# Patient Record
Sex: FEMALE | Race: WHITE | NOT HISPANIC OR LATINO | Employment: OTHER | URBAN - METROPOLITAN AREA
[De-identification: names, ages, dates, MRNs, and addresses within clinical notes are randomized per-mention and may not be internally consistent; named-entity substitution may affect disease eponyms.]

---

## 2019-09-16 ENCOUNTER — TRANSFERRED RECORDS (OUTPATIENT)
Dept: HEALTH INFORMATION MANAGEMENT | Facility: CLINIC | Age: 59
End: 2019-09-16

## 2020-02-14 ENCOUNTER — TRANSFERRED RECORDS (OUTPATIENT)
Dept: MULTI SPECIALTY CLINIC | Facility: CLINIC | Age: 60
End: 2020-02-14

## 2020-11-05 ENCOUNTER — TRANSFERRED RECORDS (OUTPATIENT)
Dept: HEALTH INFORMATION MANAGEMENT | Facility: CLINIC | Age: 60
End: 2020-11-05

## 2021-04-28 ENCOUNTER — TRANSFERRED RECORDS (OUTPATIENT)
Dept: HEALTH INFORMATION MANAGEMENT | Facility: CLINIC | Age: 61
End: 2021-04-28

## 2021-05-03 DIAGNOSIS — H91.90 UNSPECIFIED HEARING LOSS, UNSPECIFIED EAR: Primary | ICD-10-CM

## 2021-05-26 PROBLEM — H40.9 GLAUCOMA (INCREASED EYE PRESSURE): Status: ACTIVE | Noted: 2021-05-26

## 2021-05-26 PROBLEM — I10 HYPERTENSION: Status: ACTIVE | Noted: 2021-05-26

## 2021-05-26 PROBLEM — G35 MS (MULTIPLE SCLEROSIS) (H): Status: ACTIVE | Noted: 2021-05-26

## 2021-05-27 ENCOUNTER — OFFICE VISIT (OUTPATIENT)
Dept: AUDIOLOGY | Facility: OTHER | Age: 61
End: 2021-05-27
Attending: AUDIOLOGIST
Payer: COMMERCIAL

## 2021-05-27 DIAGNOSIS — H90.3 SENSORINEURAL HEARING LOSS (SNHL) OF BOTH EARS: Primary | ICD-10-CM

## 2021-05-27 DIAGNOSIS — H91.90 UNSPECIFIED HEARING LOSS, UNSPECIFIED EAR: ICD-10-CM

## 2021-05-27 PROCEDURE — 92557 COMPREHENSIVE HEARING TEST: CPT | Performed by: AUDIOLOGIST

## 2021-05-27 PROCEDURE — 92567 TYMPANOMETRY: CPT | Performed by: AUDIOLOGIST

## 2021-05-27 NOTE — PROGRESS NOTES
Audiology Evaluation Completed. Please refer SCANNED AUDIOGRAM and/or TYMPANOGRAM for BACKGROUND, RESULTS, RECOMMENDATIONS.      Lolly VEGA, [facility]  Audiologist #[id number]

## 2021-06-01 ENCOUNTER — OFFICE VISIT (OUTPATIENT)
Dept: OTOLARYNGOLOGY | Facility: OTHER | Age: 61
End: 2021-06-01
Attending: NURSE PRACTITIONER
Payer: COMMERCIAL

## 2021-06-01 VITALS
BODY MASS INDEX: 35.68 KG/M2 | WEIGHT: 170 LBS | OXYGEN SATURATION: 98 % | HEART RATE: 57 BPM | TEMPERATURE: 97.2 F | DIASTOLIC BLOOD PRESSURE: 66 MMHG | HEIGHT: 58 IN | SYSTOLIC BLOOD PRESSURE: 108 MMHG

## 2021-06-01 DIAGNOSIS — G35 MS (MULTIPLE SCLEROSIS) (H): ICD-10-CM

## 2021-06-01 DIAGNOSIS — H93.13 TINNITUS, BILATERAL: ICD-10-CM

## 2021-06-01 DIAGNOSIS — H90.3 SENSORINEURAL HEARING LOSS (SNHL) OF BOTH EARS: Primary | ICD-10-CM

## 2021-06-01 PROCEDURE — 99213 OFFICE O/P EST LOW 20 MIN: CPT | Performed by: NURSE PRACTITIONER

## 2021-06-01 PROCEDURE — G0463 HOSPITAL OUTPT CLINIC VISIT: HCPCS

## 2021-06-01 ASSESSMENT — PAIN SCALES - GENERAL: PAINLEVEL: NO PAIN (0)

## 2021-06-01 ASSESSMENT — MIFFLIN-ST. JEOR: SCORE: 1220.86

## 2021-06-01 NOTE — PATIENT INSTRUCTIONS
Medically cleared for hearing aids      Tinnitus education was provided.  Tinnitus is widely considered a disorder of cental auditory processing.     Hearing preservation was reinforced   I also cautioned the patient against investing in any oral supplements advertised to cure tinnitus.     I have also recommended yearly audiograms, masking devices or apps.  For worsening symptoms, I recommend online or in person cognitive behavioral therapy (CBT) referral.       Thank you for allowing Carolann GARCIA and our ENT team to participate in your care.  If your medications are too expensive, please call my nurse at the number listed below.  We can possibly change this medication.    If you have a scheduling or an appointment question please contact our Health Unit Coordinator at their direct line [phone].   ALL nursing questions or concerns can be directed to my Nurse Idalmis [phone].

## 2021-06-01 NOTE — NURSING NOTE
"Chief Complaint   Patient presents with     Suspected Hearing Decrease     Unspecified ear        Initial /66 (Cuff Size: Adult Regular)   Pulse 57   Temp 97.2  F (36.2  C) (Tympanic)   Ht 1.473 m (4' 10\")   Wt 77.1 kg (170 lb)   SpO2 98%   BMI 35.53 kg/m   Estimated body mass index is 35.53 kg/m  as calculated from the following:    Height as of this encounter: 1.473 m (4' 10\").    Weight as of this encounter: 77.1 kg (170 lb).  Medication Reconciliation: complete  Anabela Valle LPN    "

## 2021-06-01 NOTE — PROGRESS NOTES
Otolaryngology Note         Chief Complaint:     Patient presents with:  Suspected Hearing Decrease: Unspecified ear            History of Present Illness:     Farzana William is a 61 year old female seen today for gradual hearing loss, she has noted a worsening over the past 6 months.    She has bilateral tinnitus that is high pitched and non-pulsatile that started in the distant past and has increased recently  She notes the ringing has been bothersome.  It is constant and worse in the evenings.  She is able to fall asleep but states it wakes her up at night.    There is no otalgia or otorrhea.   No vertigo  No facial numbness or tingling.   No history of otological surgery  No history of COM or frequent OM  No history of ear surgery or trauma to the ear   No significant noise exposure.    There is a family history of hearing loss - mother has hearing loss, she has 2 siblings, no concerns with hearing loss in siblings.   No prior audiograms    Caffeine - 1-2 cups of coffee per day  Alcohol - 4 drinks per week  Tobacco - former smoker  Salt - moderate  Stress - no significant    She has a history of Primary Progressive MS diagnosed in 2005.  She reports she has had increasing right sided weakness over the past several years. She is not current with her Neurologist and is not currently taking any MS treatment.  She does home PT exercises, no formal PT.  She states she will be calling to schedule a follow up with her neurologist.      Audiogram completed 5/27/21:   Tympanograms are Type AS for both ears suggesting reduced admittance. Acoustic reflexes present and could not complete due to  continued leaking even with good probe fit.  Thresholds are normal sloping to moderate sensorineural hearing loss both ears.  Speech reception thresholds are in good agreement with pure tone average.  Word discrimination scores are excellent at supra-thresholds level         Medications:     Current Outpatient Rx   Medication  "Sig Dispense Refill     cloNIDine (CATAPRES) 0.2 MG tablet Take 0.2 tablets by mouth 2 times daily.       sertraline (ZOLOFT) 50 MG tablet Take 1 tablet by mouth daily.       simvastatin (ZOCOR) 20 MG tablet Take 1 tablet by mouth every evening.       traZODone (DESYREL) 50 MG tablet Take 1 tablet by mouth At Bedtime. As needed for sleep              Allergies:     Allergies: Patient has no known allergies.          Past Medical History:     Past Medical History:   Diagnosis Date     Alcohol abuse 2011    stopped      Chronic kidney disease 2011     Hyperplastic colon polyp 2011     MS (multiple sclerosis) 2011    primary progressive MS dx'd      Tobacco abuse 2011    quit      Unspecified essential hypertension 2004     Unspecified hypertensive heart disease with heart failure(402.91) 2004            Past Surgical History:     Past Surgical History:   Procedure Laterality Date      section X2       COLONOSCOPY  2009    due 2014     LEEP       RELEASE CARPAL TUNNEL      bilateral            Social History:     Social History     Tobacco Use     Smoking status: Former Smoker     Types: Cigarettes     Smokeless tobacco: Never Used     Tobacco comment: quit    Substance Use Topics     Alcohol use: Yes     Comment: daily     Drug use: None            Review of Systems:     ROS: See HPI         Physical Exam:     /66 (Cuff Size: Adult Regular)   Pulse 57   Temp 97.2  F (36.2  C) (Tympanic)   Ht 1.473 m (4' 10\")   Wt 77.1 kg (170 lb)   SpO2 98%   BMI 35.53 kg/m      General - The patient is well nourished and well developed, and appears to have good nutritional status.  Alert and oriented to person and place, answers questions and cooperates with examination appropriately.   Head and Face - Normocephalic and atraumatic, with no gross asymmetry noted.  The facial nerve is intact, with strong symmetric movements.  She has slight right sided " facial droop with talking, symmetric smile, HB 1/6  Voice and Breathing - The patient was breathing comfortably without the use of accessory muscles. There was no wheezing, stridor. The patients voice was clear and strong, and had appropriate pitch and quality.  Ears - External ear normal. Canals are patent. Right tympanic membrane is intact without effusion, retraction or mass. Left tympanic membrane is intact without effusion, retraction or mass.  Eyes - Extraocular movements intact, sclera were not icteric or injected, conjunctiva were pink and moist.  Mouth - Examination of the oral cavity showed pink, healthy oral mucosa. Dentition in good condition. No lesions or ulcerations noted. The tongue was mobile and midline.   Throat - The walls of the oropharynx were smooth, pink, moist, symmetric, and had no lesions or ulcerations.  The tonsillar pillars and soft palate were symmetric. The uvula was midline on elevation.    Neck - Normal midline excursion of the laryngotracheal complex during swallowing.  Full range of motion on passive movement.  Palpation of the occipital, submental, submandibular, internal jugular chain, and supraclavicular nodes did not demonstrate any abnormal lymph nodes or masses.  Palpation of the thyroid was soft and smooth, with no nodules or goiter appreciated.  The trachea was mobile and midline.  Nose - External contour is symmetric, no gross deflection or scars.  Nasal mucosa is pink and moist with no abnormal mucus.  The septum and turbinates were evaluated with nasal speculum, no polyps, masses, or purulence noted on examination of anterior nasal cavity.         Assessment and Plan:       ICD-10-CM    1. Sensorineural hearing loss (SNHL) of both ears  H90.3    2. MS (multiple sclerosis) (H)  G35    3. Tinnitus, bilateral  H93.13      Medically cleared for hearing aids      Tinnitus education was provided.  Tinnitus is widely considered a disorder of cental auditory processing.      Hearing preservation was reinforced   I also cautioned the patient against investing in any oral supplements advertised to cure tinnitus.     I have also recommended yearly audiograms, masking devices or apps.  For worsening symptoms, I recommend online or in person cognitive behavioral therapy (CBT) referral.     Follow up with Neurologist HARIS GARCIA  [facility]  4:02 PM  June 1, 2021

## 2021-06-01 NOTE — LETTER
6/1/2021         RE: Farzana William  [address] [place] [address]        Dear Colleague,    Thank you for referring your patient, Farzana William, to the [facility]. Please see a copy of my visit note below.      Otolaryngology Note         Chief Complaint:     Patient presents with:  Suspected Hearing Decrease: Unspecified ear            History of Present Illness:     Farzana William is a 61 year old female seen today for gradual hearing loss, she has noted a worsening over the past 6 months.    She has bilateral tinnitus that is high pitched and non-pulsatile that started in the distant past and has increased recently  She notes the ringing has been bothersome.  It is constant and worse in the evenings.  She is able to fall asleep but states it wakes her up at night.    There is no otalgia or otorrhea.   No vertigo  No facial numbness or tingling.   No history of otological surgery  No history of COM or frequent OM  No history of ear surgery or trauma to the ear   No significant noise exposure.    There is a family history of hearing loss - mother has hearing loss, she has 2 siblings, no concerns with hearing loss in siblings.   No prior audiograms    Caffeine - 1-2 cups of coffee per day  Alcohol - 4 drinks per week  Tobacco - former smoker  Salt - moderate  Stress - no significant    She has a history of Primary Progressive MS diagnosed in 2005.  She reports she has had increasing right sided weakness over the past several years. She is not current with her Neurologist and is not currently taking any MS treatment.  She does home PT exercises, no formal PT.  She states she will be calling to schedule a follow up with her neurologist.      Audiogram completed 5/27/21:   Tympanograms are Type AS for both ears suggesting reduced admittance. Acoustic reflexes present and could not complete due to  continued leaking even with good probe fit.  Thresholds are normal sloping to  "moderate sensorineural hearing loss both ears.  Speech reception thresholds are in good agreement with pure tone average.  Word discrimination scores are excellent at supra-thresholds level         Medications:     Current Outpatient Rx   Medication Sig Dispense Refill     cloNIDine (CATAPRES) 0.2 MG tablet Take 0.2 tablets by mouth 2 times daily.       sertraline (ZOLOFT) 50 MG tablet Take 1 tablet by mouth daily.       simvastatin (ZOCOR) 20 MG tablet Take 1 tablet by mouth every evening.       traZODone (DESYREL) 50 MG tablet Take 1 tablet by mouth At Bedtime. As needed for sleep              Allergies:     Allergies: Patient has no known allergies.          Past Medical History:     Past Medical History:   Diagnosis Date     Alcohol abuse 2011    stopped      Chronic kidney disease 2011     Hyperplastic colon polyp 2011     MS (multiple sclerosis) 2011    primary progressive MS dx'd      Tobacco abuse 2011    quit      Unspecified essential hypertension 2004     Unspecified hypertensive heart disease with heart failure(402.91) 2004            Past Surgical History:     Past Surgical History:   Procedure Laterality Date      section X2       COLONOSCOPY  2009    due 2014     LEEP       RELEASE CARPAL TUNNEL  2011    bilateral            Social History:     Social History     Tobacco Use     Smoking status: Former Smoker     Types: Cigarettes     Smokeless tobacco: Never Used     Tobacco comment: quit    Substance Use Topics     Alcohol use: Yes     Comment: daily     Drug use: None            Review of Systems:     ROS: See HPI         Physical Exam:     /66 (Cuff Size: Adult Regular)   Pulse 57   Temp 97.2  F (36.2  C) (Tympanic)   Ht 1.473 m (4' 10\")   Wt 77.1 kg (170 lb)   SpO2 98%   BMI 35.53 kg/m      General - The patient is well nourished and well developed, and appears to have good nutritional status.  Alert and oriented to " person and place, answers questions and cooperates with examination appropriately.   Head and Face - Normocephalic and atraumatic, with no gross asymmetry noted.  The facial nerve is intact, with strong symmetric movements.  She has slight right sided facial droop with talking, symmetric smile, HB 1/6  Voice and Breathing - The patient was breathing comfortably without the use of accessory muscles. There was no wheezing, stridor. The patients voice was clear and strong, and had appropriate pitch and quality.  Ears - External ear normal. Canals are patent. Right tympanic membrane is intact without effusion, retraction or mass. Left tympanic membrane is intact without effusion, retraction or mass.  Eyes - Extraocular movements intact, sclera were not icteric or injected, conjunctiva were pink and moist.  Mouth - Examination of the oral cavity showed pink, healthy oral mucosa. Dentition in good condition. No lesions or ulcerations noted. The tongue was mobile and midline.   Throat - The walls of the oropharynx were smooth, pink, moist, symmetric, and had no lesions or ulcerations.  The tonsillar pillars and soft palate were symmetric. The uvula was midline on elevation.    Neck - Normal midline excursion of the laryngotracheal complex during swallowing.  Full range of motion on passive movement.  Palpation of the occipital, submental, submandibular, internal jugular chain, and supraclavicular nodes did not demonstrate any abnormal lymph nodes or masses.  Palpation of the thyroid was soft and smooth, with no nodules or goiter appreciated.  The trachea was mobile and midline.  Nose - External contour is symmetric, no gross deflection or scars.  Nasal mucosa is pink and moist with no abnormal mucus.  The septum and turbinates were evaluated with nasal speculum, no polyps, masses, or purulence noted on examination of anterior nasal cavity.         Assessment and Plan:       ICD-10-CM    1. Sensorineural hearing loss (SNHL)  of both ears  H90.3    2. MS (multiple sclerosis) (H)  G35    3. Tinnitus, bilateral  H93.13      Medically cleared for hearing aids      Tinnitus education was provided.  Tinnitus is widely considered a disorder of cental auditory processing.     Hearing preservation was reinforced   I also cautioned the patient against investing in any oral supplements advertised to cure tinnitus.     I have also recommended yearly audiograms, masking devices or apps.  For worsening symptoms, I recommend online or in person cognitive behavioral therapy (CBT) referral.     Follow up with Neurologist HARIS GARCIA  [facility]  4:02 PM  June 1, 2021        Again, thank you for allowing me to participate in the care of your patient.        Sincerely,        Carolann Scott NP

## 2021-06-04 ENCOUNTER — MYC MEDICAL ADVICE (OUTPATIENT)
Dept: AUDIOLOGY | Facility: OTHER | Age: 61
End: 2021-06-04

## 2021-06-07 NOTE — TELEPHONE ENCOUNTER
I printed the audiogram as she requested and gave it to registration desk in sealed envelope for Pt to .

## 2021-06-26 ENCOUNTER — HEALTH MAINTENANCE LETTER (OUTPATIENT)
Age: 61
End: 2021-06-26

## 2021-08-23 ENCOUNTER — MEDICAL CORRESPONDENCE (OUTPATIENT)
Dept: BONE DENSITY | Facility: HOSPITAL | Age: 61
End: 2021-08-23

## 2021-08-24 ENCOUNTER — HOSPITAL ENCOUNTER (OUTPATIENT)
Dept: BONE DENSITY | Facility: HOSPITAL | Age: 61
Discharge: HOME | End: 2021-08-24
Attending: FAMILY MEDICINE | Admitting: INTERNAL MEDICINE
Payer: COMMERCIAL

## 2021-08-24 DIAGNOSIS — E21.0 PRIMARY HYPERPARATHYROIDISM (H): ICD-10-CM

## 2021-08-24 PROCEDURE — 77080 DXA BONE DENSITY AXIAL: CPT

## 2021-10-16 ENCOUNTER — HEALTH MAINTENANCE LETTER (OUTPATIENT)
Age: 61
End: 2021-10-16

## 2022-02-17 ENCOUNTER — TRANSFERRED RECORDS (OUTPATIENT)
Dept: HEALTH INFORMATION MANAGEMENT | Facility: CLINIC | Age: 62
End: 2022-02-17

## 2022-02-17 LAB
ALT SERPL-CCNC: 16 U/L (ref 6–29)
AST SERPL-CCNC: 15 U/L (ref 10–35)
CHOLESTEROL (EXTERNAL): 173 MG/DL
CREATININE (EXTERNAL): 0.96 MG/DL (ref 0.5–0.99)
GFR ESTIMATED (EXTERNAL): 63 ML/MIN/1.73M2
GFR ESTIMATED (IF AFRICAN AMERICAN) (EXTERNAL): 73 ML/MIN/1.73M2
GLUCOSE (EXTERNAL): 94 MG/DL (ref 65–99)
HBA1C MFR BLD: 5.5 %
HDLC SERPL-MCNC: 64 MG/DL
LDL CHOLESTEROL CALCULATED (EXTERNAL): 92 MG/DL
NON HDL CHOLESTEROL (EXTERNAL): 109 MG/DL
POTASSIUM (EXTERNAL): 3.6 MMOL/L (ref 3.5–5.3)
TRIGLYCERIDES (EXTERNAL): 79 MG/DL
TSH SERPL-ACNC: 1.29 MIU/L (ref 0.4–4.5)

## 2022-06-29 ENCOUNTER — TELEPHONE (OUTPATIENT)
Dept: FAMILY MEDICINE | Facility: OTHER | Age: 62
End: 2022-06-29

## 2022-06-29 NOTE — TELEPHONE ENCOUNTER
Called pt to reschedule new pt appointment with Dr. Oh. Pt did not like the option of next available day in Sept. Offered up Dr. Kimberly Christianson on Aug 15. Pt said forget it and hung up on me.

## 2022-07-17 ENCOUNTER — HEALTH MAINTENANCE LETTER (OUTPATIENT)
Age: 62
End: 2022-07-17

## 2022-07-18 ENCOUNTER — TRANSFERRED RECORDS (OUTPATIENT)
Dept: HEALTH INFORMATION MANAGEMENT | Facility: CLINIC | Age: 62
End: 2022-07-18

## 2022-08-08 NOTE — PROGRESS NOTES
Assessment & Plan     Primary hypertension  PAD (peripheral artery disease) (H)  Dyslipidemia  MS (multiple sclerosis) (H)  Arthritis  Depression with anxiety  Situational anxiety  Constipation, unspecified constipation type  Insomnia secondary to depression with anxiety    - Comprehensive metabolic panel (BMP + Alb, Alk Phos, ALT, AST, Total. Bili, TP)  - Magnesium    - lisinopril-hydrochlorothiazide (ZESTORETIC) 20-25 MG tablet; Take 1 tablet by mouth daily  - cloNIDine (CATAPRES) 0.1 MG tablet; Take 1 tablet (0.1 mg) by mouth 2 times daily  - simvastatin (ZOCOR) 20 MG tablet; Take 1 tablet (20 mg) by mouth At Bedtime  - oxybutynin (DITROPAN) 5 MG tablet; Take 1 tablet (5 mg) by mouth every evening  - ibuprofen (ADVIL/MOTRIN) 800 MG tablet; Take 1 tablet (800 mg) by mouth every 8 hours as needed for moderate pain  - sertraline (ZOLOFT) 50 MG tablet; Take 1 tablet (50 mg) by mouth daily  - traZODone (DESYREL) 50 MG tablet; Take 1 tablet (50 mg) by mouth At Bedtime As needed for sleep  - melatonin 10 MG TABS tablet; Take 1 tablet (10 mg) by mouth At Bedtime  - sennosides (SENOKOT) 8.6 MG tablet; Take 1 tablet by mouth daily as needed for constipation ((She takes OTC 25mg dose as needed for constipation a couple times per month))  - ALPRAZolam (XANAX) 0.25 MG tablet; As needed for flying. (Purchased in [place].)    Requested med refills sent in.  Update lytes.  Try changing her Clonidine to 0.1mg twice daily.  She will continue following with Neuro in [place] for her MS.  She declines any eval/mgmt for her PAD at this time - could consider formal ABIs and then adding aspirin and increasing her statin.  Follow-up 6 months, sooner if needed.      YVONNE ZENDEJAS,   [facility] [place]   Farzana is a 62 year old presenting for the following health issues:  Establish Care      HPI   ESTABLISH CARE     Farzana is a 62-year-old female being seen today to establish care.  She used to see Dr. Dr Grijalva  at [facility] but he retired.  She reports she was last seen about a year ago.  She also has a physician team in [place] as she lives there for 6 months out of the year, she brings in her labs from February for review.  She lives in the [facility] area, she will be leaving again on October 20 to head to [place].    Hypertension, on the lower end today.  She states she is on clonidine 0.2 mg once daily.  She states she used to be on 0.2 mg twice daily but she self decreased her medication due to having symptomatic lows in the 70s over 40s.  She is also on lisinopril-HCTZ 20-25 mg daily.  When I asked her why she is on clonidine instead of something else for her blood pressure, she reports this what she was started on a long time ago and it was never changed.    Vitamin D deficiency, currently on 2000 units daily.  Her vitamin D was in the normal range at 38 back in February.    Depression, she feels this is doing well on her Zoloft 50 mg daily.    Situational Anxiety, gets Xanax in [place].    She takes trazodone 50 mg nightly to help her sleep, she also takes melatonin 10 mg.    Constipation, occasionally uses Senokot a couple times per month.    Multiple sclerosis.  She states she used to see [facility] for this, but she is no longer following with them.  She has an upcoming appointment neurology in [place] on October 24.  She is on oxybutynin which helps with bladder control.    Hyperlipidemia, tolerating simvastatin 20 mg.    BMI 34.93.    She states ibuprofen as needed for various pains, location varies, sometimes back, feet, legs.  She has arthritis.    Mammogram -reports normal a couple years ago - declines repeat/updated mammogram as she states she would not pursure treatment if cancer were found.    Colonoscopy - March 2022 - 1 polyp, repeat 5 years.  [place].    Pap 3 yrs ago, reportedly done by Dr. Grijalva and normal, but she states history of abnormals in past.  Unsure when she is supposed to  "repeat or if she wants to repeat.    She shows me some paperwork from a home nurse screening that states she had abnormal leg circulation testing.  With questioning, she does endorse some bilateral calf cramping with activity, worse on the right, she does not wish to pursue more formal evaluation or management presently as her symptoms are not limiting her.  Lipids 2/17/22: Total 173 TG 79 HDL 64 LDL 92.        Review of Systems   Constitutional: Negative for fever.   Respiratory: Negative for shortness of breath.    Cardiovascular: Negative for chest pain, palpitations and peripheral edema.            Objective    /65 (BP Location: Right arm, Patient Position: Chair)   Pulse 59   Temp 98.7  F (37.1  C)   Resp 18   Ht 1.486 m (4' 10.5\")   Wt 77.1 kg (170 lb)   SpO2 98%   BMI 34.93 kg/m    Body mass index is 34.93 kg/m .  Physical Exam  Constitutional:       General: She is not in acute distress.     Appearance: Normal appearance.   HENT:      Head: Normocephalic and atraumatic.      Right Ear: Tympanic membrane and external ear normal.      Left Ear: Tympanic membrane and external ear normal.      Mouth/Throat:      Pharynx: No oropharyngeal exudate.   Eyes:      General: No scleral icterus.     Extraocular Movements: Extraocular movements intact.      Conjunctiva/sclera: Conjunctivae normal.      Pupils: Pupils are equal, round, and reactive to light.   Neck:      Vascular: No carotid bruit.   Cardiovascular:      Rate and Rhythm: Normal rate and regular rhythm.      Pulses:           Dorsalis pedis pulses are 1+ on the right side and 1+ on the left side.      Heart sounds: Normal heart sounds. No murmur heard.  Pulmonary:      Effort: Pulmonary effort is normal.      Breath sounds: Normal breath sounds. No wheezing.   Abdominal:      General: Bowel sounds are normal.      Palpations: Abdomen is soft.   Musculoskeletal:      Cervical back: Neck supple.   Lymphadenopathy:      Cervical: No cervical " adenopathy.   Neurological:      Mental Status: She is alert and oriented to person, place, and time.                .  ..

## 2022-08-11 ENCOUNTER — OFFICE VISIT (OUTPATIENT)
Dept: FAMILY MEDICINE | Facility: OTHER | Age: 62
End: 2022-08-11
Attending: FAMILY MEDICINE
Payer: COMMERCIAL

## 2022-08-11 VITALS
TEMPERATURE: 98.7 F | HEIGHT: 59 IN | RESPIRATION RATE: 18 BRPM | WEIGHT: 170 LBS | DIASTOLIC BLOOD PRESSURE: 65 MMHG | BODY MASS INDEX: 34.27 KG/M2 | HEART RATE: 59 BPM | SYSTOLIC BLOOD PRESSURE: 100 MMHG | OXYGEN SATURATION: 98 %

## 2022-08-11 DIAGNOSIS — I73.9 PAD (PERIPHERAL ARTERY DISEASE) (H): ICD-10-CM

## 2022-08-11 DIAGNOSIS — E78.5 DYSLIPIDEMIA: ICD-10-CM

## 2022-08-11 DIAGNOSIS — F41.8 INSOMNIA SECONDARY TO DEPRESSION WITH ANXIETY: ICD-10-CM

## 2022-08-11 DIAGNOSIS — F41.8 SITUATIONAL ANXIETY: ICD-10-CM

## 2022-08-11 DIAGNOSIS — K59.00 CONSTIPATION, UNSPECIFIED CONSTIPATION TYPE: ICD-10-CM

## 2022-08-11 DIAGNOSIS — G35 MS (MULTIPLE SCLEROSIS) (H): ICD-10-CM

## 2022-08-11 DIAGNOSIS — I10 PRIMARY HYPERTENSION: Primary | ICD-10-CM

## 2022-08-11 DIAGNOSIS — M19.90 ARTHRITIS: ICD-10-CM

## 2022-08-11 DIAGNOSIS — F41.8 DEPRESSION WITH ANXIETY: ICD-10-CM

## 2022-08-11 DIAGNOSIS — F51.05 INSOMNIA SECONDARY TO DEPRESSION WITH ANXIETY: ICD-10-CM

## 2022-08-11 LAB
ALBUMIN SERPL-MCNC: 4.1 G/DL (ref 3.4–5)
ALP SERPL-CCNC: 97 U/L (ref 40–150)
ALT SERPL W P-5'-P-CCNC: 26 U/L (ref 0–50)
ANION GAP SERPL CALCULATED.3IONS-SCNC: 8 MMOL/L (ref 3–14)
AST SERPL W P-5'-P-CCNC: 16 U/L (ref 0–45)
BILIRUB SERPL-MCNC: 0.5 MG/DL (ref 0.2–1.3)
BUN SERPL-MCNC: 27 MG/DL (ref 7–30)
CALCIUM SERPL-MCNC: 10.2 MG/DL (ref 8.5–10.1)
CHLORIDE BLD-SCNC: 106 MMOL/L (ref 94–109)
CO2 SERPL-SCNC: 25 MMOL/L (ref 20–32)
CREAT SERPL-MCNC: 0.98 MG/DL (ref 0.52–1.04)
GFR SERPL CREATININE-BSD FRML MDRD: 65 ML/MIN/1.73M2
GLUCOSE BLD-MCNC: 102 MG/DL (ref 70–99)
MAGNESIUM SERPL-MCNC: 1.8 MG/DL (ref 1.6–2.3)
POTASSIUM BLD-SCNC: 4.1 MMOL/L (ref 3.4–5.3)
PROT SERPL-MCNC: 8 G/DL (ref 6.8–8.8)
SODIUM SERPL-SCNC: 139 MMOL/L (ref 133–144)

## 2022-08-11 PROCEDURE — 80053 COMPREHEN METABOLIC PANEL: CPT | Performed by: FAMILY MEDICINE

## 2022-08-11 PROCEDURE — G0463 HOSPITAL OUTPT CLINIC VISIT: HCPCS

## 2022-08-11 PROCEDURE — 83735 ASSAY OF MAGNESIUM: CPT | Performed by: FAMILY MEDICINE

## 2022-08-11 PROCEDURE — 36415 COLL VENOUS BLD VENIPUNCTURE: CPT | Mod: ZL | Performed by: FAMILY MEDICINE

## 2022-08-11 PROCEDURE — 99214 OFFICE O/P EST MOD 30 MIN: CPT | Performed by: FAMILY MEDICINE

## 2022-08-11 RX ORDER — BUTYROSPERMUM PARKII(SHEA BUTTER), SIMMONDSIA CHINENSIS (JOJOBA) SEED OIL, ALOE BARBADENSIS LEAF EXTRACT .01; 1; 3.5 G/100G; G/100G; G/100G
LIQUID TOPICAL
COMMUNITY
Start: 2021-04-28

## 2022-08-11 RX ORDER — PHENOL 1.4 %
10 AEROSOL, SPRAY (ML) MUCOUS MEMBRANE AT BEDTIME
COMMUNITY
Start: 2022-08-11 | End: 2023-04-25

## 2022-08-11 RX ORDER — TRAZODONE HYDROCHLORIDE 50 MG/1
50 TABLET, FILM COATED ORAL AT BEDTIME
Qty: 90 TABLET | Refills: 3 | Status: SHIPPED | OUTPATIENT
Start: 2022-08-11 | End: 2023-04-25

## 2022-08-11 RX ORDER — CLONIDINE HYDROCHLORIDE 0.1 MG/1
0.1 TABLET ORAL 2 TIMES DAILY
Qty: 180 TABLET | Refills: 3 | Status: SHIPPED | OUTPATIENT
Start: 2022-08-11 | End: 2023-04-25

## 2022-08-11 RX ORDER — SIMVASTATIN 20 MG
20 TABLET ORAL
COMMUNITY
End: 2022-08-11

## 2022-08-11 RX ORDER — SIMVASTATIN 20 MG
20 TABLET ORAL AT BEDTIME
Qty: 90 TABLET | Refills: 3 | Status: SHIPPED | OUTPATIENT
Start: 2022-08-11 | End: 2023-04-25

## 2022-08-11 RX ORDER — OXYBUTYNIN CHLORIDE 5 MG/1
5 TABLET ORAL EVERY EVENING
Qty: 90 TABLET | Refills: 3 | Status: SHIPPED | OUTPATIENT
Start: 2022-08-11 | End: 2023-04-25

## 2022-08-11 RX ORDER — SENNOSIDES 8.6 MG
1 TABLET ORAL DAILY PRN
COMMUNITY
Start: 2022-08-11 | End: 2023-04-25

## 2022-08-11 RX ORDER — TRAZODONE HYDROCHLORIDE 50 MG/1
50 TABLET, FILM COATED ORAL
COMMUNITY
End: 2022-08-11

## 2022-08-11 RX ORDER — LISINOPRIL AND HYDROCHLOROTHIAZIDE 20; 25 MG/1; MG/1
1 TABLET ORAL DAILY
Qty: 90 TABLET | Refills: 3 | Status: SHIPPED | OUTPATIENT
Start: 2022-08-11 | End: 2023-04-25

## 2022-08-11 RX ORDER — LISINOPRIL AND HYDROCHLOROTHIAZIDE 20; 25 MG/1; MG/1
1 TABLET ORAL DAILY
COMMUNITY
Start: 2022-07-05 | End: 2022-08-11

## 2022-08-11 RX ORDER — IBUPROFEN 800 MG/1
800 TABLET, FILM COATED ORAL EVERY 8 HOURS PRN
Qty: 90 TABLET | Refills: 6 | Status: SHIPPED | OUTPATIENT
Start: 2022-08-11 | End: 2024-04-30

## 2022-08-11 RX ORDER — ALPRAZOLAM 0.25 MG
TABLET ORAL
COMMUNITY
Start: 2022-08-11

## 2022-08-11 RX ORDER — OXYBUTYNIN CHLORIDE 5 MG/1
TABLET ORAL
COMMUNITY
End: 2022-08-11

## 2022-08-11 RX ORDER — IBUPROFEN 800 MG/1
TABLET, FILM COATED ORAL
COMMUNITY
Start: 2021-11-10 | End: 2022-08-11

## 2022-08-11 ASSESSMENT — PAIN SCALES - GENERAL: PAINLEVEL: MILD PAIN (3)

## 2022-08-16 ASSESSMENT — ENCOUNTER SYMPTOMS
FEVER: 0
PALPITATIONS: 0
SHORTNESS OF BREATH: 0

## 2022-09-25 ENCOUNTER — HEALTH MAINTENANCE LETTER (OUTPATIENT)
Age: 62
End: 2022-09-25

## 2022-11-07 ENCOUNTER — TRANSFERRED RECORDS (OUTPATIENT)
Dept: HEALTH INFORMATION MANAGEMENT | Facility: CLINIC | Age: 62
End: 2022-11-07

## 2022-11-07 LAB
ALT SERPL-CCNC: 20 U/L (ref 0–55)
AST SERPL-CCNC: 19 U/L (ref 5–34)
CREATININE (EXTERNAL): 1.03 MG/DL (ref 0.57–1.11)
GFR ESTIMATED (EXTERNAL): 57.5 ML/MIN/1.73M2
GFR ESTIMATED (IF AFRICAN AMERICAN) (EXTERNAL): 69.6 ML/MIN/1.73M2
GLUCOSE (EXTERNAL): 89 MG/DL (ref 82–115)
HEP C HIM: NORMAL
HIV 1&2 EXT: NORMAL
POTASSIUM (EXTERNAL): 3.8 MMOL/L (ref 3.5–5.1)
TSH SERPL-ACNC: 1.14 UIU/ML (ref 0.35–4.94)

## 2022-11-21 ENCOUNTER — TRANSFERRED RECORDS (OUTPATIENT)
Dept: HEALTH INFORMATION MANAGEMENT | Facility: CLINIC | Age: 62
End: 2022-11-21

## 2022-12-05 NOTE — NURSING NOTE
"Chief Complaint   Patient presents with     Establish Care       Initial /65 (BP Location: Right arm, Patient Position: Chair)   Pulse 59   Temp 98.7  F (37.1  C)   Resp 18   Ht 1.486 m (4' 10.5\")   Wt 77.1 kg (170 lb)   SpO2 98%   BMI 34.93 kg/m   Estimated body mass index is 34.93 kg/m  as calculated from the following:    Height as of this encounter: 1.486 m (4' 10.5\").    Weight as of this encounter: 77.1 kg (170 lb).  Medication Reconciliation: complete  Simin Wong LPN    " TBD at rehab

## 2022-12-12 ENCOUNTER — TRANSFERRED RECORDS (OUTPATIENT)
Dept: HEALTH INFORMATION MANAGEMENT | Facility: CLINIC | Age: 62
End: 2022-12-12

## 2023-03-15 ENCOUNTER — TRANSFERRED RECORDS (OUTPATIENT)
Dept: HEALTH INFORMATION MANAGEMENT | Facility: CLINIC | Age: 63
End: 2023-03-15

## 2023-04-20 ENCOUNTER — TELEPHONE (OUTPATIENT)
Dept: INFUSION THERAPY | Facility: OTHER | Age: 63
End: 2023-04-20

## 2023-04-20 NOTE — NURSING NOTE
Call from Lashay, PAC, alerting patient calling to schedule Ocrevus infusion. Patient currently has no orders in chart or faxed to us. Lashay notes patient mentioned a [place] provider. Alerted to the typical process of going through PCP and often needing to establish with a local specialist who prescribes the medication she requires. Advised she ask patient to start with reaching out to PCP.

## 2023-04-24 ASSESSMENT — PATIENT HEALTH QUESTIONNAIRE - PHQ9
10. IF YOU CHECKED OFF ANY PROBLEMS, HOW DIFFICULT HAVE THESE PROBLEMS MADE IT FOR YOU TO DO YOUR WORK, TAKE CARE OF THINGS AT HOME, OR GET ALONG WITH OTHER PEOPLE: SOMEWHAT DIFFICULT
SUM OF ALL RESPONSES TO PHQ QUESTIONS 1-9: 11
SUM OF ALL RESPONSES TO PHQ QUESTIONS 1-9: 11

## 2023-04-24 ASSESSMENT — ENCOUNTER SYMPTOMS
MYALGIAS: 1
NERVOUS/ANXIOUS: 1
HEARTBURN: 0
NAUSEA: 0
PALPITATIONS: 0
HEMATURIA: 0
DIZZINESS: 0
COUGH: 0
EYE PAIN: 0
DYSURIA: 0
CONSTIPATION: 1
HEMATOCHEZIA: 0
ABDOMINAL PAIN: 0
SORE THROAT: 0
HEADACHES: 0
WEAKNESS: 1
PARESTHESIAS: 0
FREQUENCY: 0
FEVER: 0
BREAST MASS: 0
ARTHRALGIAS: 1
CHILLS: 0
JOINT SWELLING: 1
DIARRHEA: 0
SHORTNESS OF BREATH: 0

## 2023-04-24 ASSESSMENT — ACTIVITIES OF DAILY LIVING (ADL): CURRENT_FUNCTION: NO ASSISTANCE NEEDED

## 2023-04-25 ENCOUNTER — OFFICE VISIT (OUTPATIENT)
Dept: FAMILY MEDICINE | Facility: OTHER | Age: 63
End: 2023-04-25
Attending: FAMILY MEDICINE
Payer: COMMERCIAL

## 2023-04-25 VITALS
RESPIRATION RATE: 16 BRPM | SYSTOLIC BLOOD PRESSURE: 114 MMHG | TEMPERATURE: 98.3 F | DIASTOLIC BLOOD PRESSURE: 78 MMHG | HEART RATE: 63 BPM | OXYGEN SATURATION: 97 % | BODY MASS INDEX: 34.45 KG/M2 | WEIGHT: 170.9 LBS | HEIGHT: 59 IN

## 2023-04-25 DIAGNOSIS — E78.5 DYSLIPIDEMIA: ICD-10-CM

## 2023-04-25 DIAGNOSIS — Z00.00 MEDICARE ANNUAL WELLNESS VISIT, SUBSEQUENT: Primary | ICD-10-CM

## 2023-04-25 DIAGNOSIS — F51.05 INSOMNIA SECONDARY TO DEPRESSION WITH ANXIETY: ICD-10-CM

## 2023-04-25 DIAGNOSIS — F41.8 DEPRESSION WITH ANXIETY: ICD-10-CM

## 2023-04-25 DIAGNOSIS — I10 PRIMARY HYPERTENSION: ICD-10-CM

## 2023-04-25 DIAGNOSIS — F41.8 INSOMNIA SECONDARY TO DEPRESSION WITH ANXIETY: ICD-10-CM

## 2023-04-25 DIAGNOSIS — Z78.0 POSTMENOPAUSAL ESTROGEN DEFICIENCY: ICD-10-CM

## 2023-04-25 DIAGNOSIS — G35 MS (MULTIPLE SCLEROSIS) (H): ICD-10-CM

## 2023-04-25 DIAGNOSIS — I73.9 PAD (PERIPHERAL ARTERY DISEASE) (H): ICD-10-CM

## 2023-04-25 PROCEDURE — 99214 OFFICE O/P EST MOD 30 MIN: CPT | Performed by: FAMILY MEDICINE

## 2023-04-25 PROCEDURE — G0463 HOSPITAL OUTPT CLINIC VISIT: HCPCS

## 2023-04-25 RX ORDER — SIMVASTATIN 20 MG
20 TABLET ORAL AT BEDTIME
Qty: 90 TABLET | Refills: 3 | Status: SHIPPED | OUTPATIENT
Start: 2023-04-25 | End: 2023-10-31

## 2023-04-25 RX ORDER — LISINOPRIL AND HYDROCHLOROTHIAZIDE 20; 25 MG/1; MG/1
1 TABLET ORAL DAILY
Qty: 90 TABLET | Refills: 3 | Status: SHIPPED | OUTPATIENT
Start: 2023-04-25 | End: 2023-10-31

## 2023-04-25 RX ORDER — OXYBUTYNIN CHLORIDE 5 MG/1
5 TABLET ORAL EVERY EVENING
Qty: 90 TABLET | Refills: 3 | Status: SHIPPED | OUTPATIENT
Start: 2023-04-25 | End: 2023-10-31

## 2023-04-25 RX ORDER — TRAZODONE HYDROCHLORIDE 50 MG/1
50 TABLET, FILM COATED ORAL AT BEDTIME
Qty: 90 TABLET | Refills: 3 | Status: SHIPPED | OUTPATIENT
Start: 2023-04-25 | End: 2023-10-31

## 2023-04-25 RX ORDER — CLONIDINE HYDROCHLORIDE 0.1 MG/1
0.1 TABLET ORAL 2 TIMES DAILY
Qty: 180 TABLET | Refills: 3 | Status: SHIPPED | OUTPATIENT
Start: 2023-04-25 | End: 2023-10-31

## 2023-04-25 RX ORDER — OCRELIZUMAB 300 MG/10ML
INJECTION INTRAVENOUS
COMMUNITY

## 2023-04-25 RX ORDER — CHLORHEXIDINE GLUCONATE 4 %
1 LIQUID (ML) TOPICAL EVERY 24 HOURS
COMMUNITY

## 2023-04-25 ASSESSMENT — ENCOUNTER SYMPTOMS
JOINT SWELLING: 1
HEADACHES: 0
DIZZINESS: 0
NERVOUS/ANXIOUS: 1
HEMATURIA: 0
BREAST MASS: 0
DYSURIA: 0
COUGH: 0
HEARTBURN: 0
FEVER: 0
MYALGIAS: 1
EYE PAIN: 0
ABDOMINAL PAIN: 0
FREQUENCY: 0
HEMATOCHEZIA: 0
NAUSEA: 0
SHORTNESS OF BREATH: 0
PARESTHESIAS: 0
ARTHRALGIAS: 1
PALPITATIONS: 0
CHILLS: 0
WEAKNESS: 1
DIARRHEA: 0
CONSTIPATION: 1
SORE THROAT: 0

## 2023-04-25 ASSESSMENT — ACTIVITIES OF DAILY LIVING (ADL): CURRENT_FUNCTION: NO ASSISTANCE NEEDED

## 2023-04-25 ASSESSMENT — PAIN SCALES - GENERAL: PAINLEVEL: NO PAIN (0)

## 2023-04-25 NOTE — PROGRESS NOTES
"SUBJECTIVE:   Farzana is a 63 year old who presents for Preventive Visit.     Patient has been advised of split billing requirements and indicates understanding: Yes  Are you in the first 12 months of your Medicare coverage?  No    Healthy Habits:     In general, how would you rate your overall health?  Good    Frequency of exercise:  2-3 days/week    Duration of exercise:  45-60 minutes    Do you usually eat at least 4 servings of fruit and vegetables a day, include whole grains    & fiber and avoid regularly eating high fat or \"junk\" foods?  No    Medication side effects:  None    Ability to successfully perform activities of daily living:  No assistance needed    Home Safety:  No safety concerns identified    Hearing Impairment:  Find that men's voices are easier to understand than woman's    In the past 6 months, have you been bothered by leaking of urine? Yes    In general, how would you rate your overall mental or emotional health?  Fair      PHQ-2 Total Score: 4    Additional concerns today:  Yes      Just got back from winter in [place], [place].  Sees Neuro in [place], reports last seen March 11th.  Had lots of blood work done in November.  Records already requested to be sent up here.  Motrin use varies, some days without using it.  No longer on stool softener - doesn't feel she needs it.  States colonoscopy was done in [place] approx 3/31/2021  Upcoming Mammogram 6/8/23  Declines additional paps.  Will need a Ocrevus infusion in July for her MS.  Started by [place] Neuro, but needs it ordered up here as well.  Will need med refills in August, so sending in today.  BP controlled.  Still having some calf claudication, previous abnormal PASTOR screening at home, but declined formal eval.  Interested in doing this now.  Due for DEXA this fall, ordered.  Mental health stable.  [facility] lipids were checked in [place] and acceptable on her meds.  Has fallen twice in last 12 months, see below.        Have you ever done Advance Care " Planning? (For example, a Health Directive, POLST, or a discussion with a medical provider or your loved ones about your wishes): Yes, patient states has an Advance Care Planning document and will bring a copy to the clinic.      Right Ear:      1000 Hz RESPONSE- on Level: 40 db (Conditioning sound)   1000 Hz: RESPONSE- on Level: tone not heard   2000 Hz: RESPONSE- on Level: tone not heard   4000 Hz: RESPONSE- on Level: tone not heard    Left Ear:      4000 Hz: RESPONSE- on Level: tone not heard   2000 Hz: RESPONSE- on Level: tone not heard   1000 Hz: RESPONSE- on Level: tone not heard    500 Hz: RESPONSE- on Level: 25 db    Right Ear:    500 Hz: RESPONSE- on Level: 25 db    Hearing Acuity: REFER has hearing aids, not wearing them today    Hearing Assessment: abnormal  Fall risk  Fallen 2 or more times in the past year?: Yes  Any fall with injury in the past year?: No  Fall in January in [place], one the summer prior to that.  Fell while hanging clothes in the summer.  Winter - got weak.    Cognitive Screening   1) Repeat 3 items (Leader, Season, Table)    2) Clock draw: NORMAL  3) 3 item recall: Recalls 1 object   Results: NORMAL clock, 1-2 items recalled: COGNITIVE IMPAIRMENT LESS LIKELY    Mini-CogTM Copyright S Doug. Licensed by the author for use in [facility]; reprinted with permission (crow@.[facility]). All rights reserved.      Do you have sleep apnea, excessive snoring or daytime drowsiness?: no    Reviewed and updated as needed this visit by clinical staff   Tobacco  Allergies  Meds   Med Hx  Surg Hx  Fam Hx          Reviewed and updated as needed this visit by Provider   Tobacco  Allergies  Meds   Med Hx  Surg Hx  Fam Hx         Social History     Tobacco Use     Smoking status: Former     Types: Cigarettes     Smokeless tobacco: Never     Tobacco comments:     quit 2004   Vaping Use     Vaping status: Not on file   Substance Use Topics     Alcohol use: Yes     Comment: daily              4/24/2023     9:56 AM   Alcohol Use   Prescreen: >3 drinks/day or >7 drinks/week? No     Do you have a current opioid prescription? No  Do you use any other controlled substances or medications that are not prescribed by a provider? None              Current providers sharing in care for this patient include:   Patient Care Team:  Mike Em DO as PCP - General (Family Medicine)  Mike Em DO as Assigned PCP    Screenings reportedly current in [place], records pending still.  The following health maintenance items are reviewed in Epic and correct as of today:  Health Maintenance   Topic Date Due     COLORECTAL CANCER SCREENING  Never done     HIV SCREENING  Never done     MEDICARE ANNUAL WELLNESS VISIT  Never done     HEPATITIS C SCREENING  Never done     PAP  Never done     ZOSTER IMMUNIZATION (1 of 2) Never done     LUNG CANCER SCREENING  Never done     MAMMO SCREENING  11/25/2016     COVID-19 Vaccine (5 - Booster for Pfizer series) 10/28/2021     INFLUENZA VACCINE (1) Never done     LIPID  02/17/2027     DTAP/TDAP/TD IMMUNIZATION (5 - Td or Tdap) 06/24/2027     ADVANCE CARE PLANNING  04/25/2028     PHQ-2 (once per calendar year)  Completed     Pneumococcal Vaccine: Pediatrics (0 to 5 Years) and At-Risk Patients (6 to 64 Years)  Aged Out     IPV IMMUNIZATION  Aged Out     MENINGITIS IMMUNIZATION  Aged Out       FHS-7:       4/24/2023     9:57 AM   Breast CA Risk Assessment (FHS-7)   Did any of your first-degree relatives have breast or ovarian cancer? No   Did any of your relatives have bilateral breast cancer? No   Did any man in your family have breast cancer? No   Did any woman in your family have breast and ovarian cancer? No   Did any woman in your family have breast cancer before age 50 y? No   Do you have 2 or more relatives with breast and/or ovarian cancer? No   Do you have 2 or more relatives with breast and/or bowel cancer? No         Pertinent mammograms are reviewed under the imaging  "tab.    Review of Systems   Constitutional: Negative for chills and fever.   HENT: Positive for hearing loss. Negative for congestion, ear pain and sore throat.    Eyes: Negative for pain and visual disturbance.   Respiratory: Negative for cough and shortness of breath.    Cardiovascular: Negative for chest pain, palpitations and peripheral edema.   Gastrointestinal: Positive for constipation. Negative for abdominal pain, diarrhea, heartburn, hematochezia and nausea.   Breasts:  Negative for tenderness, breast mass and discharge.   Genitourinary: Positive for urgency. Negative for dysuria, frequency, hematuria, pelvic pain, vaginal bleeding and vaginal discharge.   Musculoskeletal: Positive for arthralgias, joint swelling and myalgias.   Neurological: Positive for weakness. Negative for dizziness, headaches and paresthesias.   Psychiatric/Behavioral: Negative for mood changes. The patient is nervous/anxious.        OBJECTIVE:   /78   Pulse 63   Temp 98.3  F (36.8  C) (Tympanic)   Resp 16   Ht 1.486 m (4' 10.5\")   Wt 77.5 kg (170 lb 14.4 oz)   SpO2 97%   BMI 35.11 kg/m   Estimated body mass index is 35.11 kg/m  as calculated from the following:    Height as of this encounter: 1.486 m (4' 10.5\").    Weight as of this encounter: 77.5 kg (170 lb 14.4 oz).  Physical Exam  Constitutional:       General: She is not in acute distress.     Appearance: Normal appearance.   HENT:      Head: Normocephalic and atraumatic.      Right Ear: Tympanic membrane normal.      Left Ear: Tympanic membrane normal.      Mouth/Throat:      Mouth: Mucous membranes are moist.   Eyes:      Conjunctiva/sclera: Conjunctivae normal.      Pupils: Pupils are equal, round, and reactive to light.   Neck:      Vascular: No carotid bruit.   Cardiovascular:      Rate and Rhythm: Normal rate and regular rhythm.      Heart sounds: Normal heart sounds. No murmur heard.  Pulmonary:      Effort: Pulmonary effort is normal.      Breath sounds: " "Normal breath sounds. No wheezing.   Abdominal:      General: Bowel sounds are normal.      Palpations: Abdomen is soft.      Tenderness: There is no abdominal tenderness.   Musculoskeletal:      Right lower leg: No edema.      Left lower leg: No edema.   Lymphadenopathy:      Cervical: No cervical adenopathy.   Neurological:      Mental Status: She is alert and oriented to person, place, and time.           ASSESSMENT / PLAN:       ICD-10-CM    1. MS (multiple sclerosis) (H)  G35 oxybutynin (DITROPAN) 5 MG tablet      2. Medicare annual wellness visit, subsequent  Z00.00       3. PAD (peripheral artery disease) (H)  I73.9 US PASTOR Doppler No Exercise      4. Insomnia secondary to depression with anxiety  F51.05 traZODone (DESYREL) 50 MG tablet    F41.8       5. Dyslipidemia  E78.5 simvastatin (ZOCOR) 20 MG tablet      6. Depression with anxiety  F41.8 sertraline (ZOLOFT) 50 MG tablet      7. Primary hypertension  I10 lisinopril-hydrochlorothiazide (ZESTORETIC) 20-25 MG tablet     cloNIDine (CATAPRES) 0.1 MG tablet      8. Postmenopausal estrogen deficiency  Z78.0 DX Hip/Pelvis/Spine          Records available after her appt today.  CBC, CMP okay from 5 months ago.  Negative HIV and Hep C screenings.  Lipids reviewed, may need higher intensity statin pending results of PASTOR.      Will see her back in a couple months when due for her infusion, will get labs prior to infusion.    COUNSELING:  Reviewed preventive health counseling, as reflected in patient instructions       Regular exercise       Vision screening       Dental care       Fall risk prevention       Osteoporosis prevention/bone health       Colon cancer screening       Hepatitis C screening       HIV screening for high risk patient      BMI:   Estimated body mass index is 35.11 kg/m  as calculated from the following:    Height as of this encounter: 1.486 m (4' 10.5\").    Weight as of this encounter: 77.5 kg (170 lb 14.4 oz).   Weight management plan: " Discussed healthy diet and exercise guidelines      She reports that she has quit smoking. Her smoking use included cigarettes. She has never used smokeless tobacco.      Appropriate preventive services were discussed with this patient, including applicable screening as appropriate for cardiovascular disease, diabetes, osteopenia/osteoporosis, and glaucoma.  As appropriate for age/gender, discussed screening for colorectal cancer, prostate cancer, breast cancer, and cervical cancer. Checklist reviewing preventive services available has been given to the patient.    Reviewed patients plan of care and provided an AVS. The Basic Care Plan (routine screening as documented in Health Maintenance) for Farzana meets the Care Plan requirement. This Care Plan has been established and reviewed with the Patient.      YVONNE ZENDEJAS, DO  [facility] [place]    Identified Health Risks:    I have reviewed Opioid Use Disorder and Substance Use Disorder risk factors and made any needed referrals.     Answers for HPI/ROS submitted by the patient on 4/24/2023  If you checked off any problems, how difficult have these problems made it for you to do your work, take care of things at home, or get along with other people?: Somewhat difficult  PHQ9 TOTAL SCORE: 11

## 2023-05-11 ENCOUNTER — HOSPITAL ENCOUNTER (OUTPATIENT)
Dept: ULTRASOUND IMAGING | Facility: HOSPITAL | Age: 63
Discharge: HOME | End: 2023-05-11
Attending: FAMILY MEDICINE | Admitting: FAMILY MEDICINE
Payer: COMMERCIAL

## 2023-05-11 DIAGNOSIS — I73.9 PAD (PERIPHERAL ARTERY DISEASE) (H): ICD-10-CM

## 2023-05-11 PROCEDURE — 93922 UPR/L XTREMITY ART 2 LEVELS: CPT

## 2023-05-30 ENCOUNTER — TELEPHONE (OUTPATIENT)
Dept: INFUSION THERAPY | Facility: OTHER | Age: 63
End: 2023-05-30

## 2023-05-30 DIAGNOSIS — G35 MS (MULTIPLE SCLEROSIS) (H): Primary | ICD-10-CM

## 2023-05-30 RX ORDER — HEPARIN SODIUM (PORCINE) LOCK FLUSH IV SOLN 100 UNIT/ML 100 UNIT/ML
5 SOLUTION INTRAVENOUS
Status: CANCELLED | OUTPATIENT
Start: 2023-06-22

## 2023-05-30 RX ORDER — METHYLPREDNISOLONE SODIUM SUCCINATE 125 MG/2ML
125 INJECTION, POWDER, LYOPHILIZED, FOR SOLUTION INTRAMUSCULAR; INTRAVENOUS ONCE
Status: CANCELLED | OUTPATIENT
Start: 2023-06-22

## 2023-05-30 RX ORDER — ALBUTEROL SULFATE 0.83 MG/ML
2.5 SOLUTION RESPIRATORY (INHALATION)
Status: CANCELLED | OUTPATIENT
Start: 2023-06-22

## 2023-05-30 RX ORDER — MEPERIDINE HYDROCHLORIDE 25 MG/ML
25 INJECTION INTRAMUSCULAR; INTRAVENOUS; SUBCUTANEOUS EVERY 30 MIN PRN
Status: CANCELLED | OUTPATIENT
Start: 2023-06-22

## 2023-05-30 RX ORDER — DIPHENHYDRAMINE HCL 50 MG
50 CAPSULE ORAL ONCE
Status: CANCELLED | OUTPATIENT
Start: 2023-06-22

## 2023-05-30 RX ORDER — METHYLPREDNISOLONE SODIUM SUCCINATE 125 MG/2ML
125 INJECTION, POWDER, LYOPHILIZED, FOR SOLUTION INTRAMUSCULAR; INTRAVENOUS
Status: CANCELLED
Start: 2023-06-22

## 2023-05-30 RX ORDER — HEPARIN SODIUM,PORCINE 10 UNIT/ML
5 VIAL (ML) INTRAVENOUS
Status: CANCELLED | OUTPATIENT
Start: 2023-06-22

## 2023-05-30 RX ORDER — DIPHENHYDRAMINE HYDROCHLORIDE 50 MG/ML
50 INJECTION INTRAMUSCULAR; INTRAVENOUS
Status: CANCELLED
Start: 2023-06-22

## 2023-05-30 RX ORDER — ACETAMINOPHEN 325 MG/1
650 TABLET ORAL ONCE
Status: CANCELLED | OUTPATIENT
Start: 2023-06-22

## 2023-05-30 RX ORDER — ALBUTEROL SULFATE 90 UG/1
1-2 AEROSOL, METERED RESPIRATORY (INHALATION)
Status: CANCELLED
Start: 2023-06-22

## 2023-05-30 RX ORDER — EPINEPHRINE 1 MG/ML
0.3 INJECTION, SOLUTION INTRAMUSCULAR; SUBCUTANEOUS EVERY 5 MIN PRN
Status: CANCELLED | OUTPATIENT
Start: 2023-06-22

## 2023-05-30 NOTE — TELEPHONE ENCOUNTER
LVM for pt to call back to schedule infusion per the order the maintenance dose is to start six months after the completion of the induction which would put her next dose due at 7/5/2023. (level4)

## 2023-06-01 ENCOUNTER — TELEPHONE (OUTPATIENT)
Dept: FAMILY MEDICINE | Facility: OTHER | Age: 63
End: 2023-06-01

## 2023-06-02 ENCOUNTER — TELEPHONE (OUTPATIENT)
Dept: INFUSION THERAPY | Facility: OTHER | Age: 63
End: 2023-06-02

## 2023-06-08 ENCOUNTER — TELEPHONE (OUTPATIENT)
Dept: MAMMOGRAPHY | Facility: OTHER | Age: 63
End: 2023-06-08

## 2023-06-08 ENCOUNTER — ANCILLARY PROCEDURE (OUTPATIENT)
Dept: MAMMOGRAPHY | Facility: OTHER | Age: 63
End: 2023-06-08
Attending: FAMILY MEDICINE
Payer: COMMERCIAL

## 2023-06-08 DIAGNOSIS — Z12.31 VISIT FOR SCREENING MAMMOGRAM: ICD-10-CM

## 2023-06-08 PROCEDURE — 77067 SCR MAMMO BI INCL CAD: CPT | Mod: TC

## 2023-07-13 ENCOUNTER — INFUSION THERAPY VISIT (OUTPATIENT)
Dept: INFUSION THERAPY | Facility: OTHER | Age: 63
End: 2023-07-13
Attending: FAMILY MEDICINE
Payer: COMMERCIAL

## 2023-07-13 VITALS
HEART RATE: 77 BPM | WEIGHT: 173.94 LBS | OXYGEN SATURATION: 96 % | DIASTOLIC BLOOD PRESSURE: 77 MMHG | SYSTOLIC BLOOD PRESSURE: 127 MMHG | TEMPERATURE: 98.2 F | RESPIRATION RATE: 16 BRPM | BODY MASS INDEX: 35.74 KG/M2

## 2023-07-13 DIAGNOSIS — G35 MS (MULTIPLE SCLEROSIS) (H): Primary | ICD-10-CM

## 2023-07-13 PROCEDURE — 96365 THER/PROPH/DIAG IV INF INIT: CPT

## 2023-07-13 PROCEDURE — 250N000011 HC RX IP 250 OP 636: Mod: JZ | Performed by: FAMILY MEDICINE

## 2023-07-13 PROCEDURE — 258N000003 HC RX IP 258 OP 636: Performed by: FAMILY MEDICINE

## 2023-07-13 PROCEDURE — 96366 THER/PROPH/DIAG IV INF ADDON: CPT

## 2023-07-13 PROCEDURE — 96375 TX/PRO/DX INJ NEW DRUG ADDON: CPT

## 2023-07-13 RX ORDER — DIPHENHYDRAMINE HCL 50 MG
50 CAPSULE ORAL ONCE
Status: CANCELLED | OUTPATIENT
Start: 2024-01-09

## 2023-07-13 RX ORDER — ALBUTEROL SULFATE 0.83 MG/ML
2.5 SOLUTION RESPIRATORY (INHALATION)
Status: CANCELLED | OUTPATIENT
Start: 2024-01-09

## 2023-07-13 RX ORDER — EPINEPHRINE 1 MG/ML
0.3 INJECTION, SOLUTION, CONCENTRATE INTRAVENOUS EVERY 5 MIN PRN
Status: CANCELLED | OUTPATIENT
Start: 2024-01-09

## 2023-07-13 RX ORDER — HEPARIN SODIUM (PORCINE) LOCK FLUSH IV SOLN 100 UNIT/ML 100 UNIT/ML
5 SOLUTION INTRAVENOUS
Status: CANCELLED | OUTPATIENT
Start: 2024-01-09

## 2023-07-13 RX ORDER — ALBUTEROL SULFATE 90 UG/1
1-2 AEROSOL, METERED RESPIRATORY (INHALATION)
Status: CANCELLED
Start: 2024-01-09

## 2023-07-13 RX ORDER — HEPARIN SODIUM,PORCINE 10 UNIT/ML
5 VIAL (ML) INTRAVENOUS
Status: CANCELLED | OUTPATIENT
Start: 2024-01-09

## 2023-07-13 RX ORDER — METHYLPREDNISOLONE SODIUM SUCCINATE 125 MG/2ML
125 INJECTION, POWDER, LYOPHILIZED, FOR SOLUTION INTRAMUSCULAR; INTRAVENOUS ONCE
Status: COMPLETED | OUTPATIENT
Start: 2023-07-13 | End: 2023-07-13

## 2023-07-13 RX ORDER — METHYLPREDNISOLONE SODIUM SUCCINATE 125 MG/2ML
125 INJECTION, POWDER, LYOPHILIZED, FOR SOLUTION INTRAMUSCULAR; INTRAVENOUS
Status: CANCELLED
Start: 2024-01-09

## 2023-07-13 RX ORDER — ACETAMINOPHEN 325 MG/1
650 TABLET ORAL ONCE
Status: CANCELLED | OUTPATIENT
Start: 2024-01-09

## 2023-07-13 RX ORDER — DIPHENHYDRAMINE HCL 50 MG
50 CAPSULE ORAL ONCE
Status: COMPLETED | OUTPATIENT
Start: 2023-07-13 | End: 2023-07-13

## 2023-07-13 RX ORDER — DIPHENHYDRAMINE HYDROCHLORIDE 50 MG/ML
50 INJECTION INTRAMUSCULAR; INTRAVENOUS
Status: CANCELLED
Start: 2024-01-09

## 2023-07-13 RX ORDER — METHYLPREDNISOLONE SODIUM SUCCINATE 125 MG/2ML
125 INJECTION, POWDER, LYOPHILIZED, FOR SOLUTION INTRAMUSCULAR; INTRAVENOUS ONCE
Status: CANCELLED | OUTPATIENT
Start: 2024-01-09

## 2023-07-13 RX ORDER — ACETAMINOPHEN 325 MG/1
650 TABLET ORAL ONCE
Status: COMPLETED | OUTPATIENT
Start: 2023-07-13 | End: 2023-07-13

## 2023-07-13 RX ORDER — MEPERIDINE HYDROCHLORIDE 25 MG/ML
25 INJECTION INTRAMUSCULAR; INTRAVENOUS; SUBCUTANEOUS EVERY 30 MIN PRN
Status: CANCELLED | OUTPATIENT
Start: 2024-01-09

## 2023-07-13 RX ADMIN — OCRELIZUMAB 600 MG: 300 INJECTION INTRAVENOUS at 09:56

## 2023-07-13 RX ADMIN — Medication 50 MG: at 09:27

## 2023-07-13 RX ADMIN — ACETAMINOPHEN 650 MG: 325 TABLET ORAL at 09:27

## 2023-07-13 RX ADMIN — Medication 250 ML: at 09:25

## 2023-07-13 RX ADMIN — METHYLPREDNISOLONE SODIUM SUCCINATE 125 MG: 125 INJECTION, POWDER, FOR SOLUTION INTRAMUSCULAR; INTRAVENOUS at 09:29

## 2023-07-13 NOTE — PROGRESS NOTES
Infusion Nursing Note:  Farzana William presents today for infusion of Ocrevus.    Patient seen by provider today: No   present during visit today: Not Applicable.    Note:Patient denies questions or concerns regarding today's infusion of ocrevus.    Intravenous Access:Peripheral IV placed.    Treatment Conditions:  Biological Infusion Checklist:  ~~~ NOTE: If the patient answers yes to any of the questions below, hold the infusion and contact ordering provider or on-call provider.    1. Have you recently had an elevated temperature, fever, chills, productive cough, coughing for 3 weeks or longer or hemoptysis,  abnormal vital signs, night sweats,  chest pain or have you noticed a decrease in your appetite, unexplained weight loss or fatigue? No  2. Do you have any open wounds or new incisions? No  3. Do you have any upcoming hospitalizations or surgeries? Does not include esophagogastroduodenoscopy, colonoscopy, endoscopic retrograde cholangiopancreatography (ERCP), endoscopic ultrasound (EUS), dental procedures or joint aspiration/steroid injections No  4. Do you currently have any signs of illness or infection or are you on any antibiotics? No  5. Have you had any new, sudden or worsening abdominal pain? No  6. Have you or anyone in your household received a live vaccination in the past 4 weeks? Please note: No live vaccines while on biologic/chemotherapy until 6 months after the last treatment. Patient can receive the flu vaccine (shot only), pneumovax and the Covid vaccine. It is optimal for the patient to get these vaccines mid cycle, but they can be given at any time as long as it is not on the day of the infusion. No  7. Have you recently been diagnosed with any new nervous system diseases (ie. Multiple sclerosis, Guillain [place], seizures, neurological changes) or cancer diagnosis? Are you on any form of radiation or chemotherapy? No  8. Are you pregnant or breast feeding or do you have plans of  pregnancy in the future? No  9. Have you been having any signs of worsening depression or suicidal ideations?  (benlysta only) No  10. Have there been any other new onset medical symptoms? No  11. Have you had any new blood clots? (IVIG only) No    Post Infusion Assessment:  Patient tolerated infusion without incident.  Site patent and intact, free from redness, edema or discomfort.  No evidence of extravasations.  Access discontinued per protocol.  Biologic Infusion Post Education: Call the triage nurse at your clinic or seek medical attention if you have chills and/or temperature greater than or equal to 100.5, uncontrolled nausea/vomiting, diarrhea, constipation, dizziness, shortness of breath, chest pain, heart palpitations, weakness or any other new or concerning symptoms, questions or concerns.  You cannot have any live virus vaccines prior to or during treatment or up to 6 months post infusion.  If you have an upcoming surgery, medical procedure or dental procedure during treatment, this should be discussed with your ordering physician and your surgeon/dentist.  If you are having any concerning symptom, if you are unsure if you should get your next infusion or wish to speak to a provider before your next infusion, please call your care coordinator or triage nurse at your clinic to notify them so we can adequately serve you.     Discharge Plan:   Discharge instructions reviewed with: Patient.  Patient and/or family verbalized understanding of discharge instructions and all questions answered.  Patient discharged in stable condition accompanied by: self.  Departure Mode: Ambulatory.

## 2023-09-05 ENCOUNTER — HOSPITAL ENCOUNTER (OUTPATIENT)
Dept: BONE DENSITY | Facility: HOSPITAL | Age: 63
Discharge: HOME | End: 2023-09-05
Attending: FAMILY MEDICINE | Admitting: FAMILY MEDICINE
Payer: COMMERCIAL

## 2023-09-05 DIAGNOSIS — Z78.0 POSTMENOPAUSAL ESTROGEN DEFICIENCY: ICD-10-CM

## 2023-09-05 PROCEDURE — 77080 DXA BONE DENSITY AXIAL: CPT

## 2023-09-19 DIAGNOSIS — M85.80 OSTEOPENIA, UNSPECIFIED LOCATION: Primary | ICD-10-CM

## 2023-11-21 ENCOUNTER — TRANSFERRED RECORDS (OUTPATIENT)
Dept: HEALTH INFORMATION MANAGEMENT | Facility: CLINIC | Age: 63
End: 2023-11-21

## 2023-11-22 ENCOUNTER — TRANSFERRED RECORDS (OUTPATIENT)
Dept: HEALTH INFORMATION MANAGEMENT | Facility: CLINIC | Age: 63
End: 2023-11-22

## 2023-12-11 ENCOUNTER — TRANSFERRED RECORDS (OUTPATIENT)
Dept: HEALTH INFORMATION MANAGEMENT | Facility: CLINIC | Age: 63
End: 2023-12-11

## 2023-12-20 ENCOUNTER — TRANSFERRED RECORDS (OUTPATIENT)
Dept: HEALTH INFORMATION MANAGEMENT | Facility: CLINIC | Age: 63
End: 2023-12-20

## 2024-01-05 ENCOUNTER — TRANSFERRED RECORDS (OUTPATIENT)
Dept: HEALTH INFORMATION MANAGEMENT | Facility: CLINIC | Age: 64
End: 2024-01-05

## 2024-02-28 ENCOUNTER — TRANSFERRED RECORDS (OUTPATIENT)
Dept: HEALTH INFORMATION MANAGEMENT | Facility: CLINIC | Age: 64
End: 2024-02-28

## 2024-02-28 LAB
ALT SERPL-CCNC: 27 U/L (ref 12–78)
AST SERPL-CCNC: 15 U/L (ref 15–37)
CHOLESTEROL (EXTERNAL): 180 MG/DL (ref 0–200)
CREATININE (EXTERNAL): 1.09 MG/DL (ref 0.51–0.95)
GFR ESTIMATED (EXTERNAL): 56.38 ML/MIN/1.73M2
GLUCOSE (EXTERNAL): 85 MG/DL (ref 70–100)
HDLC SERPL-MCNC: 78 MG/DL (ref 40–59)
LDL CHOLESTEROL CALCULATED (EXTERNAL): 82 MG/DL
NON HDL CHOLESTEROL (EXTERNAL): 102 MG/DL
POTASSIUM (EXTERNAL): 3.5 MMOL/L (ref 3.5–5.1)
TRIGLYCERIDES (EXTERNAL): 98 MG/DL (ref 30–150)

## 2024-03-13 ENCOUNTER — TRANSFERRED RECORDS (OUTPATIENT)
Dept: HEALTH INFORMATION MANAGEMENT | Facility: HOSPITAL | Age: 64
End: 2024-03-13

## 2024-04-01 ENCOUNTER — TRANSFERRED RECORDS (OUTPATIENT)
Dept: HEALTH INFORMATION MANAGEMENT | Facility: CLINIC | Age: 64
End: 2024-04-01

## 2024-04-23 SDOH — HEALTH STABILITY: PHYSICAL HEALTH
ON AVERAGE, HOW MANY DAYS PER WEEK DO YOU ENGAGE IN MODERATE TO STRENUOUS EXERCISE (LIKE A BRISK WALK)?: PATIENT DECLINED

## 2024-04-23 SDOH — HEALTH STABILITY: PHYSICAL HEALTH: ON AVERAGE, HOW MANY MINUTES DO YOU ENGAGE IN EXERCISE AT THIS LEVEL?: PATIENT DECLINED

## 2024-04-23 ASSESSMENT — SOCIAL DETERMINANTS OF HEALTH (SDOH): HOW OFTEN DO YOU GET TOGETHER WITH FRIENDS OR RELATIVES?: TWICE A WEEK

## 2024-04-30 ENCOUNTER — ANCILLARY PROCEDURE (OUTPATIENT)
Dept: GENERAL RADIOLOGY | Facility: OTHER | Age: 64
End: 2024-04-30
Attending: FAMILY MEDICINE
Payer: COMMERCIAL

## 2024-04-30 ENCOUNTER — OFFICE VISIT (OUTPATIENT)
Dept: FAMILY MEDICINE | Facility: OTHER | Age: 64
End: 2024-04-30
Attending: FAMILY MEDICINE
Payer: COMMERCIAL

## 2024-04-30 VITALS
WEIGHT: 170.1 LBS | OXYGEN SATURATION: 96 % | HEART RATE: 52 BPM | HEIGHT: 59 IN | TEMPERATURE: 96.9 F | DIASTOLIC BLOOD PRESSURE: 84 MMHG | RESPIRATION RATE: 18 BRPM | BODY MASS INDEX: 34.29 KG/M2 | SYSTOLIC BLOOD PRESSURE: 110 MMHG

## 2024-04-30 DIAGNOSIS — Z12.4 PAP SMEAR FOR CERVICAL CANCER SCREENING: ICD-10-CM

## 2024-04-30 DIAGNOSIS — M85.80 OSTEOPENIA, UNSPECIFIED LOCATION: ICD-10-CM

## 2024-04-30 DIAGNOSIS — M79.671 RIGHT FOOT PAIN: ICD-10-CM

## 2024-04-30 DIAGNOSIS — E78.5 DYSLIPIDEMIA: ICD-10-CM

## 2024-04-30 DIAGNOSIS — F41.8 DEPRESSION WITH ANXIETY: ICD-10-CM

## 2024-04-30 DIAGNOSIS — F41.8 INSOMNIA SECONDARY TO DEPRESSION WITH ANXIETY: ICD-10-CM

## 2024-04-30 DIAGNOSIS — R30.0 DYSURIA: ICD-10-CM

## 2024-04-30 DIAGNOSIS — G35 MS (MULTIPLE SCLEROSIS) (H): ICD-10-CM

## 2024-04-30 DIAGNOSIS — Z00.00 MEDICARE ANNUAL WELLNESS VISIT, SUBSEQUENT: Primary | ICD-10-CM

## 2024-04-30 DIAGNOSIS — F51.05 INSOMNIA SECONDARY TO DEPRESSION WITH ANXIETY: ICD-10-CM

## 2024-04-30 DIAGNOSIS — E79.0 ELEVATED BLOOD URIC ACID LEVEL: ICD-10-CM

## 2024-04-30 DIAGNOSIS — I10 PRIMARY HYPERTENSION: ICD-10-CM

## 2024-04-30 DIAGNOSIS — Z12.31 ENCOUNTER FOR SCREENING MAMMOGRAM FOR BREAST CANCER: ICD-10-CM

## 2024-04-30 PROBLEM — F41.9 ANXIETY: Status: ACTIVE | Noted: 2022-01-04

## 2024-04-30 PROBLEM — G47.00 INSOMNIA: Status: ACTIVE | Noted: 2022-01-04

## 2024-04-30 PROBLEM — M79.644 PAIN OF RIGHT THUMB: Status: ACTIVE | Noted: 2024-04-01

## 2024-04-30 PROBLEM — R32 URINARY INCONTINENCE: Status: ACTIVE | Noted: 2022-01-04

## 2024-04-30 LAB
ALBUMIN UR-MCNC: NEGATIVE MG/DL
APPEARANCE UR: CLEAR
BILIRUB UR QL STRIP: NEGATIVE
CALCIUM SERPL-MCNC: 10.7 MG/DL (ref 8.8–10.2)
COLOR UR AUTO: ABNORMAL
GLUCOSE UR STRIP-MCNC: NEGATIVE MG/DL
HGB UR QL STRIP: ABNORMAL
KETONES UR STRIP-MCNC: NEGATIVE MG/DL
LEUKOCYTE ESTERASE UR QL STRIP: NEGATIVE
NITRATE UR QL: NEGATIVE
PH UR STRIP: 6.5 [PH] (ref 4.7–8)
RBC URINE: <1 /HPF
SP GR UR STRIP: 1.01 (ref 1–1.03)
SQUAMOUS EPITHELIAL: 0 /HPF
UROBILINOGEN UR STRIP-MCNC: NORMAL MG/DL
WBC URINE: 1 /HPF

## 2024-04-30 PROCEDURE — G0439 PPPS, SUBSEQ VISIT: HCPCS | Performed by: FAMILY MEDICINE

## 2024-04-30 PROCEDURE — 73630 X-RAY EXAM OF FOOT: CPT | Mod: TC,RT

## 2024-04-30 PROCEDURE — 82607 VITAMIN B-12: CPT | Mod: ZL | Performed by: FAMILY MEDICINE

## 2024-04-30 PROCEDURE — 81001 URINALYSIS AUTO W/SCOPE: CPT | Mod: ZL | Performed by: FAMILY MEDICINE

## 2024-04-30 PROCEDURE — G0123 SCREEN CERV/VAG THIN LAYER: HCPCS | Mod: ZL | Performed by: FAMILY MEDICINE

## 2024-04-30 PROCEDURE — 87624 HPV HI-RISK TYP POOLED RSLT: CPT | Mod: ZL | Performed by: FAMILY MEDICINE

## 2024-04-30 PROCEDURE — 82310 ASSAY OF CALCIUM: CPT | Mod: ZL | Performed by: FAMILY MEDICINE

## 2024-04-30 PROCEDURE — G0463 HOSPITAL OUTPT CLINIC VISIT: HCPCS | Mod: 25

## 2024-04-30 PROCEDURE — 99214 OFFICE O/P EST MOD 30 MIN: CPT | Mod: 25 | Performed by: FAMILY MEDICINE

## 2024-04-30 PROCEDURE — 36415 COLL VENOUS BLD VENIPUNCTURE: CPT | Mod: ZL | Performed by: FAMILY MEDICINE

## 2024-04-30 RX ORDER — TRAZODONE HYDROCHLORIDE 50 MG/1
50 TABLET, FILM COATED ORAL AT BEDTIME
Qty: 90 TABLET | Refills: 1 | Status: SHIPPED | OUTPATIENT
Start: 2024-04-30

## 2024-04-30 RX ORDER — PREGABALIN 75 MG/1
CAPSULE ORAL
COMMUNITY
Start: 2023-11-27 | End: 2024-05-03

## 2024-04-30 RX ORDER — NAPROXEN 500 MG/1
1 TABLET ORAL
COMMUNITY
Start: 2024-04-01 | End: 2024-04-30

## 2024-04-30 RX ORDER — LISINOPRIL AND HYDROCHLOROTHIAZIDE 20; 25 MG/1; MG/1
1 TABLET ORAL DAILY
Qty: 90 TABLET | Refills: 1 | Status: SHIPPED | OUTPATIENT
Start: 2024-04-30

## 2024-04-30 RX ORDER — CLONIDINE HYDROCHLORIDE 0.1 MG/1
0.1 TABLET ORAL 2 TIMES DAILY
Qty: 180 TABLET | Refills: 1 | Status: SHIPPED | OUTPATIENT
Start: 2024-04-30

## 2024-04-30 RX ORDER — MELOXICAM 15 MG/1
TABLET ORAL
COMMUNITY
Start: 2024-02-28 | End: 2024-05-03

## 2024-04-30 RX ORDER — SIMVASTATIN 20 MG
20 TABLET ORAL AT BEDTIME
Qty: 90 TABLET | Refills: 1 | Status: SHIPPED | OUTPATIENT
Start: 2024-04-30

## 2024-04-30 ASSESSMENT — PAIN SCALES - GENERAL: PAINLEVEL: SEVERE PAIN (7)

## 2024-04-30 NOTE — PROGRESS NOTES
Preventive Care Visit  [facility]  YVONNE ZENDEJAS DO, Family Medicine  Apr 30, 2024      Assessment & Plan     Medicare annual wellness visit, subsequent    MS (multiple sclerosis) (H)  Will be due for Ocrevus infusion in July    Elevated blood uric acid level  No gout symptoms, never on gout meds  - Vitamin B12    Right foot pain  Not gout  - XR FOOT RT G/E 3 VW (Clinic Performed); Future  - Orthopedic  Referral; Future    Dysuria  Normal UA  - UA Macroscopic with reflex to Microscopic and Culture    Encounter for screening mammogram for breast cancer  - MA Screen Bilateral w/[place]; Future    Primary hypertension  - cloNIDine (CATAPRES) 0.1 MG tablet; Take 1 tablet (0.1 mg) by mouth 2 times daily  - lisinopril-hydrochlorothiazide (ZESTORETIC) 20-25 MG tablet; Take 1 tablet by mouth daily    Dyslipidemia  - simvastatin (ZOCOR) 20 MG tablet; Take 1 tablet (20 mg) by mouth at bedtime    Insomnia secondary to depression with anxiety  - traZODone (DESYREL) 50 MG tablet; Take 1 tablet (50 mg) by mouth at bedtime    Depression with anxiety  - sertraline (ZOLOFT) 50 MG tablet; Take 1 tablet (50 mg) by mouth daily    Osteopenia, unspecified location  - Calcium  She had a mildly elevated calcium previously, never had recheck drawn    Pap smear for cervical cancer screening  - A pap thin layer screen with  HPV - recommended age 30 - 65 years  - HPV Hold (Lab Only)        Counseling  Appropriate preventive services were discussed with this patient, including applicable screening as appropriate for fall prevention, nutrition, physical activity, Tobacco-use cessation, weight loss and cognition.  Checklist reviewing preventive services available has been given to the patient.          Ashlie Yanes is a 64 year old, presenting for the following:  Physical and Musculoskeletal Problem        4/30/2024    10:30 AM   Additional Questions   Roomed by Celina Menezes   Accompanied by          Via the  Health Maintenance questionnaire, the patient has reported the following services have been completed -Colonscopy, this information has been sent to the abstraction team.  Health Care Directive  Patient does not have a Health Care Directive or Living Will: Discussed advance care planning with patient; information given to patient to review.    HPI    MS - due for infusion in July.  Screening labs done in [place] - had elevated Uric Acid.  Burning urination and itching x2 weeks.  No vaginal discharge.  Had a bladder stimulator placed 12/20/23.    Foot pain - as below.    Bandar    Calls after her visit wanting me to send in Lyrica and Meloxicam she was getting in [place] as apparently the [place] prescription can't be filled in [place].    Feels her chronic meds are working okay and she's stable.     endorses her memory is slowly declining as expected with her MS.    Musculoskeletal problem/pain    Duration: 2weeks  Description  Location: Right foot  Intensity:  moderate  Accompanying signs and symptoms: swelling, redness, and pain  History  Previous similar problem: no   Previous evaluation:  none  Precipitating or alleviating factors:  Trauma or overuse: no   Aggravating factors include: sitting, standing, walking, climbing stairs, lifting, exercise, and overuse  Therapies tried and outcome: nothing               4/23/2024   General Health   How would you rate your overall physical health? Good   Feel stress (tense, anxious, or unable to sleep) To some extent   (!) STRESS CONCERN      4/23/2024   Nutrition   Diet: Breakfast skipped         4/23/2024   Exercise   Days per week of moderate/strenous exercise Patient declined   Average minutes spent exercising at this level Patient declined     States does chair exercises, stairs.  Exercises more than .  Last fall Feb 13.  Prior was last summer.        4/23/2024   Social Factors   Frequency of gathering with friends or relatives Twice a week   Worry food won't  last until get money to buy more No   Food not last or not have enough money for food? No   Do you have housing?  Yes   Are you worried about losing your housing? No   Lack of transportation? No   Unable to get utilities (heat,electricity)? No         2024   Fall Risk   Gait Speed Test (Document in seconds) 5          2024   Activities of Daily Living- Home Safety   Needs help with the following daily activites Toileting   Safety concerns in the home None of the above         2024   Dental   Dentist two times every year? Yes     Dentist March.  Eye exam last summer        2024   Hearing Screening   Hearing concerns? None of the above         2024   Driving Risk Screening   Patient/family members have concerns about driving No         2024   General Alertness/Fatigue Screening   Have you been more tired than usual lately? No         2024   Urinary Incontinence Screening   Bothered by leaking urine in past 6 months Yes         2024   TB Screening   Were you born outside of the [place]? No           Today's PHQ-2 Score:       2024    10:15 AM   PHQ-2 (  [employer])   Q1: Little interest or pleasure in doing things 1   Q2: Feeling down, depressed or hopeless 1   PHQ-2 Score 2   Q1: Little interest or pleasure in doing things Several days   Q2: Feeling down, depressed or hopeless Several days   PHQ-2 Score 2         2024   Substance Use   Alcohol more than 3/day or more than 7/wk No   Do you have a current opioid prescription? (!) YES   How severe/bad is pain from 1 to 10? 5/10   Do you use any other substances recreationally? No          No data to display              Low Risk (0-3)  Moderate Risk (4-7)  High Risk (>8)  Social History     Tobacco Use    Smoking status: Former     Current packs/day: 0.00     Average packs/day: 1 pack/day for 10.0 years (10.0 ttl pk-yrs)     Types: Cigarettes     Quit date: 6/10/2006     Years since quittin.9    Smokeless tobacco: Never     Tobacco comments:     quit 2004   Substance Use Topics    Alcohol use: Yes     Comment: daily    Drug use: Not Currently     Types: Marijuana, Anabolic steroids, Hydrocodone, Morphine           6/8/2023   LAST FHS-7 RESULTS   1st degree relative breast or ovarian cancer No   Any relative bilateral breast cancer No   Any male have breast cancer No   Any ONE woman have BOTH breast AND ovarian cancer No   Any woman with breast cancer before 50yrs Yes   2 or more relatives with breast AND/OR ovarian cancer Yes              Last pap maybe 7 yrs ago.  Due today.   Mammo 1 year ago  Colonoscopy done 2-3 year ago by her report - 1 polyp - states 5 yr repeat planned.       ASCVD Risk   The 10-year ASCVD risk score (Kari SAUCEDO, et al., 2019) is: 4.8%    Values used to calculate the score:      Age: 64 years      Sex: Female      Is Non- : No      Diabetic: No      Tobacco smoker: No      Systolic Blood Pressure: 110 mmHg      Is BP treated: Yes      HDL Cholesterol: 64 mg/dL      Total Cholesterol: 173 mg/dL    DEXA 9/2023 - due next year      Reviewed and updated as needed this visit by Provider        Daylin Singh               Current providers sharing in care for this patient include:  Patient Care Team:  Mike Em DO as PCP - General (Family Medicine)  Mike Em DO as Assigned PCP    The following health maintenance items are reviewed in Epic and correct as of today:  Health Maintenance   Topic Date Due    Pneumococcal Vaccine: 65+ Years (1 of 2 - PCV) Never done    COLORECTAL CANCER SCREENING  12/31/2020    LIPID  02/17/2023    COVID-19 Vaccine (6 - 2023-24 season) 01/21/2024    MEDICARE ANNUAL WELLNESS VISIT  04/25/2024    FALL RISK ASSESSMENT  04/30/2025    MAMMO SCREENING  06/08/2025    GLUCOSE  11/07/2025    DTAP/TDAP/TD IMMUNIZATION (5 - Td or Tdap) 06/24/2027    ADVANCE CARE PLANNING  04/30/2029    DEXA  09/05/2038    HEPATITIS C SCREENING  Completed    HIV SCREENING   "Completed    PHQ-2 (once per calendar year)  Completed    INFLUENZA VACCINE  Completed    ZOSTER IMMUNIZATION  Completed    RSV VACCINE (Pregnancy & 60+)  Completed    IPV IMMUNIZATION  Aged Out    HPV IMMUNIZATION  Aged Out    MENINGITIS IMMUNIZATION  Aged Out    RSV MONOCLONAL ANTIBODY  Aged Out         Review of Systems  Constitutional, HEENT, cardiovascular, pulmonary, GI, , musculoskeletal, neuro, skin, endocrine and psych systems are negative, except as otherwise noted.     Objective    Exam  /84   Pulse 52   Temp 96.9  F (36.1  C) (Tympanic)   Resp 18   Ht 1.486 m (4' 10.5\")   Wt 77.2 kg (170 lb 1.6 oz)   SpO2 96%   BMI 34.95 kg/m     Estimated body mass index is 34.95 kg/m  as calculated from the following:    Height as of this encounter: 1.486 m (4' 10.5\").    Weight as of this encounter: 77.2 kg (170 lb 1.6 oz).    Physical Exam  Exam conducted with a chaperone present.   Constitutional:       General: She is not in acute distress.     Appearance: Normal appearance.   HENT:      Head: Normocephalic and atraumatic.      Right Ear: Tympanic membrane normal.      Left Ear: Tympanic membrane normal.      Mouth/Throat:      Mouth: Mucous membranes are moist.      Pharynx: Oropharynx is clear.   Eyes:      Extraocular Movements: Extraocular movements intact.      Pupils: Pupils are equal, round, and reactive to light.   Neck:      Vascular: No carotid bruit.   Cardiovascular:      Rate and Rhythm: Normal rate and regular rhythm.      Heart sounds: Normal heart sounds. No murmur heard.  Pulmonary:      Effort: Pulmonary effort is normal.      Breath sounds: Normal breath sounds.   Abdominal:      General: Bowel sounds are normal.      Palpations: Abdomen is soft.   Genitourinary:     General: Normal vulva.      Pubic Area: No rash.       Comments: Normal appearing  Musculoskeletal:      Right lower leg: No edema.      Left lower leg: No edema.   Lymphadenopathy:      Cervical: No cervical " adenopathy.   Skin:     General: Skin is warm and dry.   Neurological:      Mental Status: She is alert and oriented to person, place, and time.      Comments: Mild Cognitive impairment is fairly obvious with prolonged speaking with her.                4/30/2024   Mini Cog   Clock Draw Score 2 Normal   3 Item Recall 2 objects recalled   Mini Cog Total Score 4              Signed Electronically by: YVONNE ZENDEJAS DO

## 2024-05-02 ENCOUNTER — MYC MEDICAL ADVICE (OUTPATIENT)
Dept: FAMILY MEDICINE | Facility: OTHER | Age: 64
End: 2024-05-02

## 2024-05-02 DIAGNOSIS — R52 GENERALIZED PAIN: ICD-10-CM

## 2024-05-02 DIAGNOSIS — M19.90 ARTHRITIS: ICD-10-CM

## 2024-05-02 DIAGNOSIS — G35 MS (MULTIPLE SCLEROSIS) (H): Primary | ICD-10-CM

## 2024-05-03 LAB — VIT B12 SERPL-MCNC: 1380 PG/ML (ref 232–1245)

## 2024-05-06 LAB
BKR LAB AP GYN ADEQUACY: NORMAL
BKR LAB AP GYN INTERPRETATION: NORMAL
BKR LAB AP HPV REFLEX: NORMAL
BKR LAB AP PREVIOUS ABNORMAL: NORMAL
PATH REPORT.COMMENTS IMP SPEC: NORMAL
PATH REPORT.COMMENTS IMP SPEC: NORMAL
PATH REPORT.RELEVANT HX SPEC: NORMAL

## 2024-05-06 RX ORDER — PREGABALIN 75 MG/1
75 CAPSULE ORAL 3 TIMES DAILY
Qty: 90 CAPSULE | Refills: 2 | Status: SHIPPED | OUTPATIENT
Start: 2024-05-06 | End: 2024-07-31

## 2024-05-06 RX ORDER — MELOXICAM 15 MG/1
15 TABLET ORAL DAILY
Qty: 30 TABLET | Refills: 3 | Status: SHIPPED | OUTPATIENT
Start: 2024-05-06 | End: 2024-07-31

## 2024-05-08 ENCOUNTER — OFFICE VISIT (OUTPATIENT)
Dept: PODIATRY | Facility: OTHER | Age: 64
End: 2024-05-08
Attending: PODIATRIST
Payer: COMMERCIAL

## 2024-05-08 VITALS
SYSTOLIC BLOOD PRESSURE: 146 MMHG | OXYGEN SATURATION: 96 % | RESPIRATION RATE: 20 BRPM | TEMPERATURE: 97.6 F | DIASTOLIC BLOOD PRESSURE: 85 MMHG | HEART RATE: 63 BPM

## 2024-05-08 DIAGNOSIS — G35 MS (MULTIPLE SCLEROSIS) (H): ICD-10-CM

## 2024-05-08 DIAGNOSIS — M20.42 ACQUIRED HAMMER TOE DEFORMITY OF LESSER TOE OF BOTH FEET: ICD-10-CM

## 2024-05-08 DIAGNOSIS — M20.41 ACQUIRED HAMMER TOE DEFORMITY OF LESSER TOE OF BOTH FEET: ICD-10-CM

## 2024-05-08 DIAGNOSIS — M25.571 SINUS TARSI SYNDROME OF RIGHT ANKLE: Primary | ICD-10-CM

## 2024-05-08 LAB
HUMAN PAPILLOMA VIRUS 16 DNA: NEGATIVE
HUMAN PAPILLOMA VIRUS 18 DNA: NEGATIVE
HUMAN PAPILLOMA VIRUS FINAL DIAGNOSIS: NORMAL
HUMAN PAPILLOMA VIRUS OTHER HR: NEGATIVE

## 2024-05-08 PROCEDURE — 20605 DRAIN/INJ JOINT/BURSA W/O US: CPT | Performed by: PODIATRIST

## 2024-05-08 PROCEDURE — 250N000011 HC RX IP 250 OP 636: Performed by: PODIATRIST

## 2024-05-08 PROCEDURE — G0463 HOSPITAL OUTPT CLINIC VISIT: HCPCS | Mod: 25

## 2024-05-08 PROCEDURE — 99203 OFFICE O/P NEW LOW 30 MIN: CPT | Mod: 25 | Performed by: PODIATRIST

## 2024-05-08 RX ORDER — DEXAMETHASONE SODIUM PHOSPHATE 4 MG/ML
4 INJECTION, SOLUTION INTRA-ARTICULAR; INTRALESIONAL; INTRAMUSCULAR; INTRAVENOUS; SOFT TISSUE ONCE
Status: COMPLETED | OUTPATIENT
Start: 2024-05-08 | End: 2024-05-08

## 2024-05-08 RX ORDER — TRIAMCINOLONE ACETONIDE 40 MG/ML
40 INJECTION, SUSPENSION INTRA-ARTICULAR; INTRAMUSCULAR ONCE
Status: COMPLETED | OUTPATIENT
Start: 2024-05-08 | End: 2024-05-08

## 2024-05-08 RX ADMIN — TRIAMCINOLONE ACETONIDE 40 MG: 40 INJECTION, SUSPENSION INTRA-ARTICULAR; INTRAMUSCULAR at 13:37

## 2024-05-08 RX ADMIN — DEXAMETHASONE SODIUM PHOSPHATE 4 MG: 4 INJECTION, SOLUTION INTRA-ARTICULAR; INTRALESIONAL; INTRAMUSCULAR; INTRAVENOUS; SOFT TISSUE at 13:36

## 2024-05-08 ASSESSMENT — PAIN SCALES - GENERAL: PAINLEVEL: MILD PAIN (3)

## 2024-05-08 NOTE — PROGRESS NOTES
Chief complaint: Patient presents with:  Consult  Foot Problems    History of Present Illness: This 64 year old female is seen at the request of No ref. provider found for evaluation and suggestions of management of RIGHT lateral ankle pain. The pain has been ongoing for several months. The pain has been improving, but it is most noticeable when she is walking. Pain is worse on stairs on uneven surfaces. She is looking for treatment options.    She also sometimes has pain from her hammertoes. They rub in her shoes which can be uncomfortable for her.    She says she has MS which is currently controlled and managed by her neurologist in [place].     No further pedal complaints today.       BP (!) 146/85   Pulse 63   Temp 97.6  F (36.4  C) (Tympanic)   Resp 20   SpO2 96%     Patient Active Problem List   Diagnosis    Glaucoma (increased eye pressure)    Hypertension    MS (multiple sclerosis) (H)    Anxiety    Insomnia    Urinary incontinence    Pain of right thumb       Past Surgical History:   Procedure Laterality Date    ABDOMEN SURGERY  1978,1986         section X2      COLONOSCOPY  2009    due     COLONOSCOPY - HIM SCAN N/A 2016    Dr Pruett, [facility] - repeat in 5 years    ENT SURGERY      Broken nose    GYN SURGERY      Cevix frozen 2x,2c-sections    LEEP      RELEASE CARPAL TUNNEL  2011    bilateral    SOFT TISSUE SURGERY         Current Outpatient Medications   Medication Sig Dispense Refill    ALPRAZolam (XANAX) 0.25 MG tablet As needed for flying. (Purchased in [place].)      cholecalciferol (D3 ) 50 MCG ( [place]) CAPS       cloNIDine (CATAPRES) 0.1 MG tablet Take 1 tablet (0.1 mg) by mouth 2 times daily 180 tablet 1    lisinopril-hydrochlorothiazide (ZESTORETIC) 20-25 MG tablet Take 1 tablet by mouth daily 90 tablet 1    Melatonin 12 MG TABS Take 1 tablet by mouth every 24 hours      meloxicam (MOBIC) 15 MG tablet Take 1 tablet (15 mg) by mouth daily 30  tablet 3    ocrelizumab (OCREVUS) 300 MG/10ML SOLN injection every 6 months      pregabalin (LYRICA) 75 MG capsule Take 1 capsule (75 mg) by mouth 3 times daily 90 capsule 2    sertraline (ZOLOFT) 50 MG tablet Take 1 tablet (50 mg) by mouth daily 90 tablet 3    simvastatin (ZOCOR) 20 MG tablet Take 1 tablet (20 mg) by mouth at bedtime 90 tablet 1    traZODone (DESYREL) 50 MG tablet Take 1 tablet (50 mg) by mouth at bedtime 90 tablet 1    vitamin B-12 (CYANOCOBALAMIN) 100 MCG tablet Take 500 mcg by mouth every other day       No current facility-administered medications for this visit.        No Known Allergies    Family History   Problem Relation Age of Onset    C.A.D. Brother     Depression Brother     Depression Other         half brother(M)    Heart Disease Mother         valve problem    Hypertension Mother     Osteoporosis Mother     Hypertension Other         half brother(M)    Other - See Comments Brother         von Willebrand's Disease    Colon Cancer Maternal Grandfather     Hypertension Paternal Half-Brother     Obesity Daughter        Social History     Socioeconomic History    Marital status:    Occupational History    Occupation: disabled   Tobacco Use    Smoking status: Former     Current packs/day: 0.00     Average packs/day: 1 pack/day for 10.0 years (10.0 ttl pk-yrs)     Types: Cigarettes     Quit date: 6/10/2006     Years since quittin.9    Smokeless tobacco: Never    Tobacco comments:     quit    Substance and Sexual Activity    Alcohol use: Yes     Comment: daily    Drug use: Not Currently     Types: Marijuana, Anabolic steroids, Hydrocodone, Morphine    Sexual activity: Not Currently     Partners: Female     Birth control/protection: Female Surgical     Comment: 1987   Other Topics Concern    Caffeine Concern Yes     Comment: 3 cups coffee daily    Parent/sibling w/ CABG, MI or angioplasty before 65F 55M? No     Social Determinants of Health     Financial Resource Strain:  Low Risk  (4/23/2024)    Financial Resource Strain     Within the past 12 months, have you or your family members you live with been unable to get utilities (heat, electricity) when it was really needed?: No   Food Insecurity: Low Risk  (4/23/2024)    Food Insecurity     Within the past 12 months, did you worry that your food would run out before you got money to buy more?: No     Within the past 12 months, did the food you bought just not last and you didn t have money to get more?: No   Transportation Needs: Low Risk  (4/23/2024)    Transportation Needs     Within the past 12 months, has lack of transportation kept you from medical appointments, getting your medicines, non-medical meetings or appointments, work, or from getting things that you need?: No   Physical Activity: Patient Declined (4/23/2024)    Exercise Vital Sign     Days of Exercise per Week: Patient declined     Minutes of Exercise per Session: Patient declined   Stress: Stress Concern Present (4/23/2024)    [employer] - Occupational Stress Questionnaire     Feeling of Stress : To some extent   Social Connections: Unknown (4/23/2024)    Social Connection and Isolation Panel [NHANES]     Frequency of Social Gatherings with Friends and Family: Twice a week   Housing Stability: Low Risk  (4/23/2024)    Housing Stability     Do you have housing? : Yes     Are you worried about losing your housing?: No       ROS: 10 point ROS neg other than the symptoms noted above in the HPI.  EXAM  Constitutional: healthy, alert, and no distress    Psychiatric: mentation appears normal and affect normal/bright    VASCULAR:  -Dorsalis pedis pulse +2/4 b/l  -Posterior tibial pulse +2/4 b/l  -Capillary refill time < 3 seconds to b/l hallux  NEURO:  -Light touch sensation intact to b/l plantar forefoot  DERM:  -Skin temperature, texture and turgor WNL b/l  MSK:  -Pain on palpation to the RIGHT sinus tarsi  -Mild tenderness on the dorsal PIPJs  of the lesser toes    -DORSIFLEXION contracture to MTPJ 2-5 b/l with flexion contracture to PIPJ of digits 2-5 b/l  -Muscle strength of ankles +5/5 for dorsiflexion, plantarflexion, ABDUction and ADDuction b/l    -Ankle joint passive ROM within normal limits except for dorsiflexion:    Dorsiflexion, RIGHT Straight knee 0 degrees    Dorsiflexion, LEFT Straight knee 0 degrees    ============================================================    ASSESSMENT:  (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 Treatment options discussed with no educational barriers noted.  Sinus tarsi pain:  -Discussed etiology and treatment options for sinus tarsi pain. Patient has a moderate arch collapse and a gastrocnemius equinus bilaterally. Explained to patient what this means and how it places additional stress on the sinus tarsi. This pain can often be treated with conservative treatment options, but this is more of a chronic condition so it may take time and persistence with treatment options before pain is moderately reduced. The following conservative treatment options were reviewed:    -Stability Shoe Gear: This involves wearing a solid tennis shoe that bends at the toe, but has a solid midfoot portion of the shoe that does not bend or twist in half, and a rigid heel contour.   -----Harley, Asics, and New Balance are a few brands that have several types of stability tennis shoes. However, these brands also carry lightweight shoes that do not meet the above criteria, so look for a stability tennis shoe.  -----Harley tend to have a wider toe box if you have difficulty finding wide enough shoes for your feet.   -----Any brand of can be worn as long as it meets the above three criteria. COnsider rigid tennis shoes if pain is  increasing.  -Compression socks: Advised to wear compression socks all day (especially when working) to decrease LOWER EXTREMITY swelling in both feet and legs. Apply the socks first thing in the morning before getting out of bed and remove them at night when the feet are elevated in bed.  -Stretching: Stretch the calf muscles to increase flexibility of the calf muscles. If possible, aim to stretch the calf muscles for a combined total of one hour per day.  -Consider supportive sandals for around the house (such as [place], Vionics, Birkenstocks, Keens, Merrells, or Oofos sandals)    -Patient says she wears sandals and she does not like to wear closed shoes. She will only consider tennis shoes if her pain worsens.    -Steroid injection x 1 to the RIGHT sinus tarsi:   -Obtained written and verbal consent from patient for a steroid injection into the above location(s). Reviewed risks and benefits of the injection. Informed patient that the injection may decrease pain long-term, short-term, or not at all. A steroid injection can potentially weaken the surrounding structures of the injected site such as weaken the insertion of nearby tendons and cartilage or thin cartilage. The goal of the injection, however, is to reduce the inflammation to stop the chronic inflammatory cycle. The injection in combination with other treatment options may help reduce pain. Some people develop an increased flare of pain within a few days of the injection which usually resolves. Patient understands risks and benefits of the injection and is in agreement with an injection today in an effort to slow or reverse the chronic inflammatory process and pain in the foot.   ---Patient signed informed consent and a time-out was performed and there was verification of correct patient, procedure and laterality.  ---Prepped the injection site with an alcohol swab.  ---Injected a total of 3 mL of 1:1:1 of 4mg Dexamethasone, 40mg Kenalog, and 1mL of 2%  Lidocaine plain. Patient tolerated the injection(s) well.    ---------------------------------    -Hammertoes:  ---Discussed etiology and treatment options for hammertoes.  ---Discussed how this deformity can progress and what can be done to treat the deformity. Biomechanics such as flat feet, a tight Achilles tendons, pronation, supination, and other factors can lead to the formation of hammertoes as tendons become stronger on one side of the toe over the other side of the toe.  ---Conservative treatment options consist of wider shoe gear / shoes with more depth, and padding/splinting to accommodate the painful toe (such as toe sleeves or crest pads).  --Size small silicone toe sleeves can reduce rubbing on the tops and tips of the toes. Patient may find these at [facility] or [facility]. Patient should not wear the toe sleeve(s) at night, but only wear the sleeve in shoes and/or socks during the day. The sleeves are re-usable and hand washable until they wear out.    ---Discussed surgical treatment options including risks and benefits and post-op periods. Hammertoes can be straightened with an implant and/or K-wire. A K-wire is sometimes needed to hold the toe in a plantarflexed position so it does not lift at the MTPJ. The wire may need to be in the foot for up to six weeks and requires offloading of the surgical foot. Patient cannot drive if the surgery is on the RIGHT foot until the pins are removed.   ---In cases where there is a severe hammertoe causing pain and offloading is not an option, a toe amputation may be considered. However, multiple conservative treatment options are available and should be exhausted before proceeding with surgery.     ---At this time, patient would like to proceed with silicone toe sleeves when the pain increases. Patient will call if the toe pain worsens.  -This is an acute, uncomplicated illness/injury with OTC treatment options reviewed.    MS: Contributes to patient's overall pain.  This is managed by a neurologist in [place].    -Patient in agreement with the above treatment plan and all of patient's questions were answered.      Return to clinic as needed per patient request        Yelitza Arnold DPM

## 2024-05-08 NOTE — PATIENT INSTRUCTIONS
-Stability Shoe Gear: This involves wearing a solid tennis shoe that bends at the toe, but has a solid midfoot portion of the shoe that does not bend or twist in half, and a rigid heel contour.   -----Harley, Asics, and New Balance are a few brands that have several types of stability tennis shoes. However, these brands also carry lightweight shoes that do not meet the above criteria, so look for a stability tennis shoe.  -----Harley tend to have a wider toe box if you have difficulty finding wide enough shoes for your feet.   -----Any brand of can be worn as long as it meets the above three criteria. COnsider rigid tennis shoes if pain is increasing.  -Compression socks: Advised to wear compression socks all day (especially when working) to decrease LOWER EXTREMITY swelling in both feet and legs. Apply the socks first thing in the morning before getting out of bed and remove them at night when the feet are elevated in bed.  -Stretching: Stretch the calf muscles to increase flexibility of the calf muscles. If possible, aim to stretch the calf muscles for a combined total of one hour per day.  -Consider supportive sandals for around the house (such as [place], Vionics, Birkenstocks, Keens, Merrells, or Oofos sandals)

## 2024-05-30 DIAGNOSIS — G35 MS (MULTIPLE SCLEROSIS) (H): Primary | ICD-10-CM

## 2024-05-30 RX ORDER — METHYLPREDNISOLONE SODIUM SUCCINATE 125 MG/2ML
125 INJECTION INTRAMUSCULAR; INTRAVENOUS ONCE
Status: CANCELLED | OUTPATIENT
Start: 2024-05-30

## 2024-05-30 RX ORDER — DIPHENHYDRAMINE HYDROCHLORIDE 50 MG/ML
50 INJECTION INTRAMUSCULAR; INTRAVENOUS
Status: CANCELLED
Start: 2024-05-30

## 2024-05-30 RX ORDER — ACETAMINOPHEN 325 MG/1
650 TABLET ORAL ONCE
Status: CANCELLED | OUTPATIENT
Start: 2024-05-30

## 2024-05-30 RX ORDER — METHYLPREDNISOLONE SODIUM SUCCINATE 125 MG/2ML
125 INJECTION INTRAMUSCULAR; INTRAVENOUS
Status: CANCELLED
Start: 2024-05-30

## 2024-05-30 RX ORDER — DIPHENHYDRAMINE HCL 50 MG
50 CAPSULE ORAL ONCE
Status: CANCELLED | OUTPATIENT
Start: 2024-05-30

## 2024-05-30 RX ORDER — HEPARIN SODIUM,PORCINE 10 UNIT/ML
5 VIAL (ML) INTRAVENOUS
Status: CANCELLED | OUTPATIENT
Start: 2024-05-30

## 2024-05-30 RX ORDER — MEPERIDINE HYDROCHLORIDE 25 MG/ML
25 INJECTION INTRAMUSCULAR; INTRAVENOUS; SUBCUTANEOUS EVERY 30 MIN PRN
Status: CANCELLED | OUTPATIENT
Start: 2024-05-30

## 2024-05-30 RX ORDER — ALBUTEROL SULFATE 90 UG/1
1-2 INHALANT RESPIRATORY (INHALATION)
Status: CANCELLED
Start: 2024-05-30

## 2024-05-30 RX ORDER — EPINEPHRINE 1 MG/ML
0.3 INJECTION, SOLUTION INTRAMUSCULAR; SUBCUTANEOUS EVERY 5 MIN PRN
Status: CANCELLED | OUTPATIENT
Start: 2024-05-30

## 2024-05-30 RX ORDER — ALBUTEROL SULFATE 0.83 MG/ML
2.5 SOLUTION RESPIRATORY (INHALATION)
Status: CANCELLED | OUTPATIENT
Start: 2024-05-30

## 2024-05-30 RX ORDER — HEPARIN SODIUM (PORCINE) LOCK FLUSH IV SOLN 100 UNIT/ML 100 UNIT/ML
5 SOLUTION INTRAVENOUS
Status: CANCELLED | OUTPATIENT
Start: 2024-05-30

## 2024-07-23 ENCOUNTER — MEDICAL CORRESPONDENCE (OUTPATIENT)
Dept: HEALTH INFORMATION MANAGEMENT | Facility: CLINIC | Age: 64
End: 2024-07-23

## 2024-07-23 ENCOUNTER — MYC REFILL (OUTPATIENT)
Dept: FAMILY MEDICINE | Facility: OTHER | Age: 64
End: 2024-07-23

## 2024-07-23 ENCOUNTER — TELEPHONE (OUTPATIENT)
Dept: FAMILY MEDICINE | Facility: OTHER | Age: 64
End: 2024-07-23

## 2024-07-23 NOTE — TELEPHONE ENCOUNTER
Returned phone call to [facility] in regards to this patient. Pt has been getting infusions in [place] and is now in [place], this team needs patient's PCP in [place] to co-sign orders for patient to have this team to continue infusions in [place]. Information will be faxed for PCP to sign and we will return fax.   Zofia Hoffman, KARLENE      Description: pipe from [facility] called would like to talk to Dr. Em care team about pt getting home infusions but needs a dr to sign off please call bredon     Was an appointment offered for this call? No  If yes : Appointment type              Date     Preferred method for responding to this message: [email or web address]  What is your phone number ? [phone] option 5

## 2024-07-23 NOTE — TELEPHONE ENCOUNTER
11:30 AM    Reason for Call: Phone Call    Description: pipe from [facility] called would like to talk to Dr. Em care team about pt getting home infusions but needs a dr to sign off please call yin    Was an appointment offered for this call? No  If yes : Appointment type              Date    Preferred method for responding to this message: [employer]  What is your phone number ? [phone] option 5    If we cannot reach you directly, may we leave a detailed response at the number you provided? Yes    Can this message wait until your PCP/provider returns, if available today? [address]

## 2024-07-26 RX ORDER — OCRELIZUMAB 300 MG/10ML
INJECTION INTRAVENOUS
OUTPATIENT
Start: 2024-07-26

## 2024-07-26 ASSESSMENT — ANXIETY QUESTIONNAIRES
4. TROUBLE RELAXING: SEVERAL DAYS
2. NOT BEING ABLE TO STOP OR CONTROL WORRYING: MORE THAN HALF THE DAYS
1. FEELING NERVOUS, ANXIOUS, OR ON EDGE: SEVERAL DAYS
GAD7 TOTAL SCORE: 11
3. WORRYING TOO MUCH ABOUT DIFFERENT THINGS: MORE THAN HALF THE DAYS
GAD7 TOTAL SCORE: 11
7. FEELING AFRAID AS IF SOMETHING AWFUL MIGHT HAPPEN: MORE THAN HALF THE DAYS
8. IF YOU CHECKED OFF ANY PROBLEMS, HOW DIFFICULT HAVE THESE MADE IT FOR YOU TO DO YOUR WORK, TAKE CARE OF THINGS AT HOME, OR GET ALONG WITH OTHER PEOPLE?: SOMEWHAT DIFFICULT
7. FEELING AFRAID AS IF SOMETHING AWFUL MIGHT HAPPEN: MORE THAN HALF THE DAYS
5. BEING SO RESTLESS THAT IT IS HARD TO SIT STILL: NOT AT ALL
6. BECOMING EASILY ANNOYED OR IRRITABLE: NEARLY EVERY DAY
IF YOU CHECKED OFF ANY PROBLEMS ON THIS QUESTIONNAIRE, HOW DIFFICULT HAVE THESE PROBLEMS MADE IT FOR YOU TO DO YOUR WORK, TAKE CARE OF THINGS AT HOME, OR GET ALONG WITH OTHER PEOPLE: SOMEWHAT DIFFICULT

## 2024-07-31 ENCOUNTER — ANCILLARY PROCEDURE (OUTPATIENT)
Dept: MAMMOGRAPHY | Facility: OTHER | Age: 64
End: 2024-07-31
Attending: FAMILY MEDICINE
Payer: COMMERCIAL

## 2024-07-31 ENCOUNTER — OFFICE VISIT (OUTPATIENT)
Dept: FAMILY MEDICINE | Facility: OTHER | Age: 64
End: 2024-07-31
Attending: FAMILY MEDICINE
Payer: COMMERCIAL

## 2024-07-31 VITALS
OXYGEN SATURATION: 97 % | BODY MASS INDEX: 38.46 KG/M2 | HEART RATE: 50 BPM | SYSTOLIC BLOOD PRESSURE: 114 MMHG | WEIGHT: 187.2 LBS | TEMPERATURE: 98.5 F | DIASTOLIC BLOOD PRESSURE: 70 MMHG

## 2024-07-31 DIAGNOSIS — M19.90 ARTHRITIS: ICD-10-CM

## 2024-07-31 DIAGNOSIS — G35 MS (MULTIPLE SCLEROSIS) (H): ICD-10-CM

## 2024-07-31 DIAGNOSIS — E83.52 HYPERCALCEMIA: ICD-10-CM

## 2024-07-31 DIAGNOSIS — R32 BLADDER LEAK: Primary | ICD-10-CM

## 2024-07-31 DIAGNOSIS — R52 GENERALIZED PAIN: ICD-10-CM

## 2024-07-31 DIAGNOSIS — Z12.31 ENCOUNTER FOR SCREENING MAMMOGRAM FOR BREAST CANCER: ICD-10-CM

## 2024-07-31 DIAGNOSIS — F41.8 DEPRESSION WITH ANXIETY: ICD-10-CM

## 2024-07-31 PROCEDURE — 77063 BREAST TOMOSYNTHESIS BI: CPT | Mod: TC

## 2024-07-31 PROCEDURE — G0463 HOSPITAL OUTPT CLINIC VISIT: HCPCS

## 2024-07-31 PROCEDURE — G0463 HOSPITAL OUTPT CLINIC VISIT: HCPCS | Mod: 25

## 2024-07-31 PROCEDURE — 99214 OFFICE O/P EST MOD 30 MIN: CPT | Performed by: FAMILY MEDICINE

## 2024-07-31 RX ORDER — SERTRALINE HYDROCHLORIDE 100 MG/1
100 TABLET, FILM COATED ORAL DAILY
Qty: 90 TABLET | Refills: 1 | Status: SHIPPED | OUTPATIENT
Start: 2024-07-31

## 2024-07-31 RX ORDER — MELOXICAM 15 MG/1
15 TABLET ORAL DAILY
Qty: 30 TABLET | Refills: 3 | Status: SHIPPED | OUTPATIENT
Start: 2024-07-31

## 2024-07-31 RX ORDER — PREGABALIN 75 MG/1
75 CAPSULE ORAL 3 TIMES DAILY
Qty: 90 CAPSULE | Refills: 2 | Status: SHIPPED | OUTPATIENT
Start: 2024-07-31

## 2024-07-31 ASSESSMENT — PAIN SCALES - GENERAL: PAINLEVEL: MODERATE PAIN (4)

## 2024-07-31 NOTE — PROGRESS NOTES
"  Assessment & Plan     MS (multiple sclerosis) (H)  - pregabalin (LYRICA) 75 MG capsule; Take 1 capsule (75 mg) by mouth 3 times daily  - meloxicam (MOBIC) 15 MG tablet; Take 1 tablet (15 mg) by mouth daily    Arthritis  - pregabalin (LYRICA) 75 MG capsule; Take 1 capsule (75 mg) by mouth 3 times daily  - meloxicam (MOBIC) 15 MG tablet; Take 1 tablet (15 mg) by mouth daily    Generalized pain  - pregabalin (LYRICA) 75 MG capsule; Take 1 capsule (75 mg) by mouth 3 times daily    Bladder leak  - Adult Urology  Referral; Future  - UA Macroscopic with reflex to Microscopic and Culture; Future    Depression with anxiety  Try increasing.  See is this might help her pain and migraines as well.  - sertraline (ZOLOFT) 100 MG tablet; Take 1 tablet (100 mg) by mouth daily    Hypercalcemia  Will do as lab visit in a couple weeks - just had infusion yesterday.  - Basic metabolic panel; Future  - Ionized Calcium; Future  - Parathyroid Hormone Intact; Future  - Vitamin D Deficiency; Future      Offered local neurology referral - she will be back in [place] soon before they'd be able to get her in.  She has an appt scheduled already down there.  Discussed she may wish to have a local neurologist also given her complicated history since she is spending significant time up here each year as well.        Subjective   Farzana is a 64 year old, presenting for the following health issues:  medicaiton review      7/31/2024    10:16 AM   Additional Questions   Roomed by Juan Waters lpn   Accompanied by self       Had infusion done at her house yesterday    Asking for Urologist referral.  Having bladder leakage after bladder stim was placed Dec 21 2023 in [place], [place] - Dr. Green - Clinic name \"[facility]\"  Was good for a while, 7 months, now coming back.      Depression  Getting worse, more so the anxiety - on Zoloft 50mg.  Mainly jittery/anxious, not panic attacks    Mind - asking about it as in notes in mychart - feels stable " (memory)    Pain in low back chronic - slowly progressive    HA - migraines getting worse - last 1-3 days at a time - used to be on ppx meds - doesn't remember what they were - stopped because HA's got better - was off meds x7-8 yrs.  No current issue      Weight - up 15 lbs in 3 months , less active and eating different up here - foot drop getting worse and afraid of falling - doesn't have a brace or anything - 6-7 yrs - states MS related      Asking about previous lab results.  Elevated Ca.  Was supposed to have labs drawn before her infusion but didn't                Social History     Tobacco Use    Smoking status: Former     Current packs/day: 0.00     Average packs/day: 1 pack/day for 10.0 years (10.0 ttl pk-yrs)     Types: Cigarettes     Quit date: 6/10/2006     Years since quittin.1    Smokeless tobacco: Never    Tobacco comments:     quit    Vaping Use    Vaping status: Never Used   Substance Use Topics    Alcohol use: Yes     Comment: daily    Drug use: Not Currently     Types: Marijuana, Anabolic steroids, Hydrocodone, Morphine         2023     9:56 AM   PHQ   PHQ-9 Total Score 11   Q9: Thoughts of better off dead/self-harm past 2 weeks Not at all         2024     4:11 PM   AURA-7 SCORE   Total Score 11 (moderate anxiety)   Total Score 11         2023     9:56 AM   Last PHQ-9   1.  Little interest or pleasure in doing things 2   2.  Feeling down, depressed, or hopeless 2   3.  Trouble falling or staying asleep, or sleeping too much 1   4.  Feeling tired or having little energy 2   5.  Poor appetite or overeating 1   6.  Feeling bad about yourself 0   7.  Trouble concentrating 0   8.  Moving slowly or restless 3   Q9: Thoughts of better off dead/self-harm past 2 weeks 0   PHQ-9 Total Score 11         2024     4:11 PM   AURA-7    1. Feeling nervous, anxious, or on edge 1   2. Not being able to stop or control worrying 2   3. Worrying too much about different things 2   4. Trouble  relaxing 1   5. Being so restless that it is hard to sit still 0   6. Becoming easily annoyed or irritable 3   7. Feeling afraid, as if something awful might happen 2   AURA-7 Total Score 11   If you checked any problems, how difficult have they made it for you to do your work, take care of things at home, or get along with other people? Somewhat difficult                   Objective    /70 (BP Location: Left arm, Patient Position: Sitting, Cuff Size: Adult Regular)   Pulse 50   Temp 98.5  F (36.9  C) (Tympanic)   Wt 84.9 kg (187 lb 3.2 oz)   SpO2 97%   BMI 38.46 kg/m    Body mass index is 38.46 kg/m .  Physical Exam  Constitutional:       General: She is not in acute distress.     Appearance: Normal appearance.   HENT:      Head: Normocephalic and atraumatic.      Right Ear: Tympanic membrane normal.      Left Ear: Tympanic membrane normal.      Nose: Nose normal.      Mouth/Throat:      Mouth: Mucous membranes are moist.      Pharynx: Oropharynx is clear.   Eyes:      Extraocular Movements: Extraocular movements intact.      Conjunctiva/sclera: Conjunctivae normal.      Pupils: Pupils are equal, round, and reactive to light.   Neck:      Vascular: No carotid bruit.   Cardiovascular:      Rate and Rhythm: Normal rate and regular rhythm.      Heart sounds: Normal heart sounds. No murmur heard.  Pulmonary:      Effort: Pulmonary effort is normal.      Breath sounds: Normal breath sounds.   Abdominal:      General: Bowel sounds are normal. There is no distension.      Palpations: Abdomen is soft.      Tenderness: There is no abdominal tenderness.   Musculoskeletal:      Comments: Weakness of prox RLE, able to dorsiflex right ankle   Lymphadenopathy:      Cervical: No cervical adenopathy.   Neurological:      General: No focal deficit present.      Mental Status: She is alert and oriented to person, place, and time.                    Signed Electronically by: YVONNE ZENDEJAS DO

## 2024-08-01 ENCOUNTER — TELEPHONE (OUTPATIENT)
Dept: MAMMOGRAPHY | Facility: HOSPITAL | Age: 64
End: 2024-08-01

## 2024-08-14 ENCOUNTER — LAB (OUTPATIENT)
Dept: LAB | Facility: OTHER | Age: 64
End: 2024-08-14
Payer: COMMERCIAL

## 2024-08-14 DIAGNOSIS — E83.52 HYPERCALCEMIA: ICD-10-CM

## 2024-08-14 LAB
ANION GAP SERPL CALCULATED.3IONS-SCNC: 10 MMOL/L (ref 7–15)
BUN SERPL-MCNC: 17.2 MG/DL (ref 8–23)
CA-I BLD-MCNC: 5.4 MG/DL (ref 4.4–5.2)
CALCIUM SERPL-MCNC: 11 MG/DL (ref 8.8–10.4)
CHLORIDE SERPL-SCNC: 101 MMOL/L (ref 98–107)
CREAT SERPL-MCNC: 1.02 MG/DL (ref 0.51–0.95)
EGFRCR SERPLBLD CKD-EPI 2021: 61 ML/MIN/1.73M2
GLUCOSE SERPL-MCNC: 99 MG/DL (ref 70–99)
HCO3 SERPL-SCNC: 25 MMOL/L (ref 22–29)
POTASSIUM SERPL-SCNC: 3.9 MMOL/L (ref 3.4–5.3)
PTH-INTACT SERPL-MCNC: 55 PG/ML (ref 15–65)
SODIUM SERPL-SCNC: 136 MMOL/L (ref 135–145)
VIT D+METAB SERPL-MCNC: 83 NG/ML (ref 20–50)

## 2024-08-14 PROCEDURE — 82330 ASSAY OF CALCIUM: CPT | Mod: ZL

## 2024-08-14 PROCEDURE — 80048 BASIC METABOLIC PNL TOTAL CA: CPT | Mod: ZL

## 2024-08-14 PROCEDURE — 36415 COLL VENOUS BLD VENIPUNCTURE: CPT | Mod: ZL

## 2024-08-14 PROCEDURE — 83970 ASSAY OF PARATHORMONE: CPT | Mod: ZL

## 2024-08-14 PROCEDURE — 82306 VITAMIN D 25 HYDROXY: CPT | Mod: ZL

## 2024-08-19 NOTE — RESULT ENCOUNTER NOTE
Patient notified of results. Seen by patient Farzana William on 8/18/2024  9:19 AM  Celina Nunes LPN

## 2024-08-21 ENCOUNTER — MYC MEDICAL ADVICE (OUTPATIENT)
Dept: FAMILY MEDICINE | Facility: OTHER | Age: 64
End: 2024-08-21

## 2024-08-21 DIAGNOSIS — G35 MS (MULTIPLE SCLEROSIS) (H): Primary | ICD-10-CM

## 2024-08-21 NOTE — TELEPHONE ENCOUNTER
8/21/2024 3:13 PM  Writer called patient regarding my chart message. See message.  Patient didn't answer.    Annalisa STODDARD RN, Care Coordinator

## 2024-08-22 NOTE — TELEPHONE ENCOUNTER
Per LOV note.    Offered local neurology referral - she will be back in [place] soon before they'd be able to get her in.  She has an appt scheduled already down there.  Discussed she may wish to have a local neurologist also given her complicated history since she is spending significant time up here each year as well.     Pt now requesting Neuro for [place] area. Consult approximately April 2025. Referral pended.     Annalisa Lorenzana RN

## 2024-09-12 ENCOUNTER — TELEPHONE (OUTPATIENT)
Dept: INFUSION THERAPY | Facility: OTHER | Age: 64
End: 2024-09-12

## 2024-09-27 NOTE — TELEPHONE ENCOUNTER
Spoke with patient. She wanted to be referred to Rheumatology for low platelets. I advised patient that with that not being a diagnosis in her chart, we are unable to refer her for that.   Patient will reach out to [facility] to see if they will refer. She states that [facility] hasn't added that DX to her chart and she's unsure of why.      ocrelizumab (OCREVUS) 300 MG/10ML SOLN injection       Last Written Prescription Date:  0  Last Fill Quantity: 0,   # refills: 0  Last Office Visit: 04/30/2024  Future Office visit:    Next 5 appointments (look out 90 days)      Jul 31, 2024 10:30 AM  (Arrive by 10:15 AM)  Provider Visit with Mike Em DO  [facility] [place] ([facility] [place] ) [address] [place] [address] AVE  [place] [address]  [phone]             Routing refill request to provider for review/approval because:  [Patient requested: [facility]]     Kimberly Boecker, RN

## 2024-10-03 ENCOUNTER — LAB (OUTPATIENT)
Dept: LAB | Facility: OTHER | Age: 64
End: 2024-10-03
Attending: UROLOGY
Payer: COMMERCIAL

## 2024-10-03 ENCOUNTER — HOSPITAL ENCOUNTER (OUTPATIENT)
Dept: ULTRASOUND IMAGING | Facility: HOSPITAL | Age: 64
Discharge: HOME | End: 2024-10-03
Attending: UROLOGY
Payer: COMMERCIAL

## 2024-10-03 ENCOUNTER — OFFICE VISIT (OUTPATIENT)
Dept: UROLOGY | Facility: OTHER | Age: 64
End: 2024-10-03
Attending: UROLOGY
Payer: COMMERCIAL

## 2024-10-03 VITALS
HEART RATE: 68 BPM | DIASTOLIC BLOOD PRESSURE: 90 MMHG | OXYGEN SATURATION: 94 % | RESPIRATION RATE: 18 BRPM | SYSTOLIC BLOOD PRESSURE: 134 MMHG

## 2024-10-03 DIAGNOSIS — G35 MS (MULTIPLE SCLEROSIS) (H): ICD-10-CM

## 2024-10-03 DIAGNOSIS — R32 URINARY INCONTINENCE, UNSPECIFIED TYPE: Primary | ICD-10-CM

## 2024-10-03 DIAGNOSIS — R32 URINARY INCONTINENCE, UNSPECIFIED TYPE: ICD-10-CM

## 2024-10-03 DIAGNOSIS — R33.9 INCOMPLETE BLADDER EMPTYING: ICD-10-CM

## 2024-10-03 DIAGNOSIS — Z87.898 HISTORY OF URINARY RETENTION: ICD-10-CM

## 2024-10-03 DIAGNOSIS — Z96.82 SACRAL NEUROSTIMULATOR IN SITU: ICD-10-CM

## 2024-10-03 DIAGNOSIS — R32 BLADDER LEAK: ICD-10-CM

## 2024-10-03 LAB
ALBUMIN UR-MCNC: NEGATIVE MG/DL
APPEARANCE UR: CLEAR
BILIRUB UR QL STRIP: NEGATIVE
COLOR UR AUTO: NORMAL
GLUCOSE UR STRIP-MCNC: NEGATIVE MG/DL
HGB UR QL STRIP: NEGATIVE
KETONES UR STRIP-MCNC: NEGATIVE MG/DL
LEUKOCYTE ESTERASE UR QL STRIP: NEGATIVE
NITRATE UR QL: NEGATIVE
PH UR STRIP: 6.5 [PH] (ref 4.7–8)
RBC URINE: 0 /HPF
SP GR UR STRIP: 1.01 (ref 1–1.03)
SQUAMOUS EPITHELIAL: 0 /HPF
UROBILINOGEN UR STRIP-MCNC: NORMAL MG/DL
WBC URINE: <1 /HPF

## 2024-10-03 PROCEDURE — 99204 OFFICE O/P NEW MOD 45 MIN: CPT | Mod: 25 | Performed by: UROLOGY

## 2024-10-03 PROCEDURE — 81001 URINALYSIS AUTO W/SCOPE: CPT | Mod: ZL

## 2024-10-03 PROCEDURE — 51701 INSERT BLADDER CATHETER: CPT | Performed by: UROLOGY

## 2024-10-03 PROCEDURE — 76770 US EXAM ABDO BACK WALL COMP: CPT

## 2024-10-03 PROCEDURE — G0463 HOSPITAL OUTPT CLINIC VISIT: HCPCS | Mod: 25 | Performed by: UROLOGY

## 2024-10-03 RX ORDER — ESTRADIOL 0.1 MG/G
CREAM VAGINAL
Qty: 42.5 G | Refills: 2 | Status: SHIPPED | OUTPATIENT
Start: 2024-10-03

## 2024-10-03 ASSESSMENT — PAIN SCALES - GENERAL: PAINLEVEL: MODERATE PAIN (4)

## 2024-10-03 NOTE — PROGRESS NOTES
HPI:    Farzana William is a 64 year old woman seen today for evaluation of urinary incontinence.  She is seen at the request of Dr. Em.    She has had many years of bladder issues, dating back to around the time she was diagnosed with MS in the early 2000s.  She was doing CIC for a period of time around 2006-07.  Some point, she had an indwelling catheter.    Splits her time between [place] and [place].  Last year, she saw a urologist.  She had an evaluation to include urodynamics.  This showed incomplete bladder emptying, urinary retention.  She had poor compliance.  She also had some bowel complaints.  On 12/20/23 she had an Interstim placed.  She does feel like things got a bit better after the Interstim was placed.  Around 3 months ago she feels like things got worse then.    Lately, she has noticed that she leaks a lot a times. She changes pads a couple times per day.  She is waking twice at night and is wet when she wakes.  She has gone through all of the programs at different levels on her interstim.  She is not sure what she is presently on.    Daily fluid intake:  Coffee - usually 2 x 12oz cups  Water, maybe 2-3 x 24oz reid per day  Diet coke, usually 12oz  3oz EtOH with diet coke    Bowels are a struggle.  Has historically been constipated but sometimes is loose as well.  Currently probably taking Colace.    Does not get UTIs.    Not on estrogen.  She c/o a lot of vaginal itching and irritation.      ROS:   10 point review of systems performed.  Pertinent positives and negatives in HPI.    Past Medical History:   Diagnosis Date    Alcohol abuse 11/14/2011    stopped 2005    Arthritis     Cerebral infarction (H) 10/2007    Chronic kidney disease 11/14/2011    Congestive heart failure (H) 06/2006    Depressive disorder     Heart disease 06/2006    History of blood transfusion 01/1978    Hyperplastic colon polyp 11/14/2011    MS (multiple sclerosis) 11/14/2011    primary progressive MS dx'd 2005     Tobacco abuse 2011    quit 2003    Unspecified essential hypertension 2004    Unspecified hypertensive heart disease with heart failure(402.91) 2004       Past Surgical History:   Procedure Laterality Date    ABDOMEN SURGERY  1978,1986         section X2      COLONOSCOPY  2009    due 2014    COLONOSCOPY - HIM SCAN N/A 2016    Dr Pruett, [facility] - repeat in 5 years    ENT SURGERY      Broken nose    GYN SURGERY      Cevix frozen 2x,2c-sections    LEEP      RELEASE CARPAL TUNNEL  2011    bilateral    SOFT TISSUE SURGERY         Family History   Problem Relation Age of Onset    C.A.D. Brother     Depression Brother     Depression Other         half brother(M)    Heart Disease Mother         valve problem    Hypertension Mother     Osteoporosis Mother     Hypertension Other         half brother(M)    Other - See Comments Brother         von Willebrand's Disease    Colon Cancer Maternal Grandfather     Hypertension Paternal Half-Brother     Obesity Daughter         Social History     Tobacco Use    Smoking status: Former     Current packs/day: 0.00     Average packs/day: 1 pack/day for 10.0 years (10.0 ttl pk-yrs)     Types: Cigarettes     Quit date: 6/10/2006     Years since quittin.3    Smokeless tobacco: Never    Tobacco comments:     quit    Vaping Use    Vaping status: Never Used   Substance Use Topics    Alcohol use: Yes     Comment: daily    Drug use: Not Currently     Types: Marijuana, Anabolic steroids, Hydrocodone, Morphine        Current Outpatient Medications   Medication Sig Dispense Refill    ALPRAZolam (XANAX) 0.25 MG tablet As needed for flying. (Purchased in [place].)      cholecalciferol (D3 ) 50 MCG ( UT) CAPS       cloNIDine (CATAPRES) 0.1 MG tablet Take 1 tablet (0.1 mg) by mouth 2 times daily 180 tablet 1    lisinopril-hydrochlorothiazide (ZESTORETIC) 20-25 MG tablet Take 1 tablet by mouth daily 90 tablet 1    Melatonin 12  MG TABS Take 1 tablet by mouth every 24 hours      meloxicam (MOBIC) 15 MG tablet Take 1 tablet (15 mg) by mouth daily 30 tablet 3    ocrelizumab (OCREVUS) 300 MG/10ML SOLN injection every 6 months      pregabalin (LYRICA) 75 MG capsule Take 1 capsule (75 mg) by mouth 3 times daily 90 capsule 2    sertraline (ZOLOFT) 100 MG tablet Take 1 tablet (100 mg) by mouth daily 90 tablet 1    simvastatin (ZOCOR) 20 MG tablet Take 1 tablet (20 mg) by mouth at bedtime 90 tablet 1    traZODone (DESYREL) 50 MG tablet Take 1 tablet (50 mg) by mouth at bedtime 90 tablet 1    vitamin B-12 (CYANOCOBALAMIN) 100 MCG tablet Take 500 mcg by mouth every other day       No current facility-administered medications for this visit.       Exam:  BP (!) 134/90   Pulse 68   Resp 18   SpO2 94%     Gen: Pleasant, NAD  HEENT: WNL  Resp: nonlabored on RA  Abd: soft, ND, NT to palpation  : Normal external genitalia without lesions.  Urethral meatus is normal.  Supine stress test is normal.  No significant prolapse is present.  There is moderate atrophy.  No pelvic floor tenderness.    Bladder scan was showing 0cc.    PVR checked via straight cath shortly after voiding today is 175cc.    Results/Data:    Lab on 08/14/2024   Component Date Value Ref Range Status    Sodium 08/14/2024 136  135 - 145 mmol/L Final    Potassium 08/14/2024 3.9  3.4 - 5.3 mmol/L Final    Chloride 08/14/2024 101  98 - 107 mmol/L Final    Carbon Dioxide (CO2) 08/14/2024 25  22 - 29 mmol/L Final    Anion Gap 08/14/2024 10  7 - 15 mmol/L Final    Urea Nitrogen 08/14/2024 17.2  8.0 - 23.0 mg/dL Final    Creatinine 08/14/2024 1.02 (H)  0.51 - 0.95 mg/dL Final    GFR Estimate 08/14/2024 61  >60 mL/min/1.73m2 Final    eGFR calculated using 2021 CKD-EPI equation.    Calcium 08/14/2024 11.0 (H)  8.8 - 10.4 mg/dL Final    Reference intervals for this test were updated on 7/16/2024 to reflect our healthy population more accurately. There may be differences in the flagging of prior  results with similar values performed with this method. Those prior results can be interpreted in the context of the updated reference intervals.    Glucose 08/14/2024 99  70 - 99 mg/dL Final    Calcium Ionized Whole Blood 08/14/2024 5.4 (H)  4.4 - 5.2 mg/dL Final    Parathyroid Hormone Intact 08/14/2024 55  15 - 65 pg/mL Final    Vitamin D, Total (25-Hydroxy) 08/14/2024 83 (H)  20 - 50 ng/mL Final    toxicity possible      Today's UA is pending.    UA 4/30/24 showed trace blood on dip, <1 RBC/hpf on micro  No urine cultures on file.      Assessment and Plan:    Farzana William is a 64 year old woman seen today for the following:      MS (multiple sclerosis) (H)  Incomplete bladder emptying  History of urinary retention  Sacral neurostimulator in situ    Discussion today about all of the possibilities regarding her bladder symptoms.  First, when MS is present, any change in bladder symptoms could represent a change in her MS.  While she is not obviously having other symptom changes, this is something to be cognizant of over time.    Recommended that given the bladder change, we do obtain a renal ultrasound because her urodynamics report did indicate poor bladder compliance.    I have recommended that she turn off her Interstim altogether for a period of time.  Sometimes, this reset will result in improved symptoms again once the device is turned back on.  She states that she knows how to do this and is agreeable.    I offered her a trial of topical estrogen.  She does have some atrophic changes and this may be helpful in controlling her urgency and irritation.    She is planning to follow-up with her urologist in [place] in November.  She is leaving the area in just a couple of weeks.  I will plan to see her back next year after she returns.

## 2024-10-28 DIAGNOSIS — E78.5 DYSLIPIDEMIA: ICD-10-CM

## 2024-10-28 DIAGNOSIS — I10 PRIMARY HYPERTENSION: ICD-10-CM

## 2024-10-28 RX ORDER — SIMVASTATIN 20 MG
20 TABLET ORAL AT BEDTIME
Qty: 90 TABLET | Refills: 3 | Status: SHIPPED | OUTPATIENT
Start: 2024-10-28

## 2024-10-28 NOTE — TELEPHONE ENCOUNTER
CLONIDINE 0.1MG TABLETS       Last Written Prescription Date:  04/30/2024  Last Fill Quantity: 180,   # refills: 1  Last Office Visit: 07/31/2024  Future Office visit:       Routing refill request to provider for review/approval because:    Kimberly Boecker, RN

## 2024-10-30 DIAGNOSIS — I10 PRIMARY HYPERTENSION: ICD-10-CM

## 2024-10-30 RX ORDER — CLONIDINE HYDROCHLORIDE 0.1 MG/1
0.1 TABLET ORAL 2 TIMES DAILY
Qty: 180 TABLET | Refills: 1 | Status: SHIPPED | OUTPATIENT
Start: 2024-10-30

## 2024-10-31 NOTE — TELEPHONE ENCOUNTER
LISINOPRIL-HCTZ 20/25MG TABLETS       Last Written Prescription Date:  04/30/2024  Last Fill Quantity: 90,   # refills: 1  Last Office Visit: 07/31/2024  Future Office visit:       Routing refill request to provider for review/approval because:    Diuretics (Including Combos) Protocol Oypuzy70/30/2024 10:24 PM   Protocol Details Most recent blood pressure under 140/90 in past 12 months        Kimberly Boecker, RN

## 2024-11-01 RX ORDER — LISINOPRIL AND HYDROCHLOROTHIAZIDE 20; 25 MG/1; MG/1
1 TABLET ORAL DAILY
Qty: 90 TABLET | Refills: 1 | Status: SHIPPED | OUTPATIENT
Start: 2024-11-01

## 2024-12-31 DIAGNOSIS — F41.8 INSOMNIA SECONDARY TO DEPRESSION WITH ANXIETY: ICD-10-CM

## 2024-12-31 DIAGNOSIS — F51.05 INSOMNIA SECONDARY TO DEPRESSION WITH ANXIETY: ICD-10-CM

## 2025-01-02 RX ORDER — TRAZODONE HYDROCHLORIDE 50 MG/1
50 TABLET, FILM COATED ORAL AT BEDTIME
Qty: 90 TABLET | Refills: 1 | Status: SHIPPED | OUTPATIENT
Start: 2025-01-02

## 2025-01-02 NOTE — PROGRESS NOTES
Trazodone  Last filled 4/30/24 #90, 1 R  Last office visit 7/31  Mindy Trivedi, RN  Care Coordination

## 2025-01-23 ENCOUNTER — TELEPHONE (OUTPATIENT)
Dept: FAMILY MEDICINE | Facility: OTHER | Age: 65
End: 2025-01-23

## 2025-01-23 DIAGNOSIS — E83.52 HYPERCALCEMIA: Primary | ICD-10-CM

## 2025-01-23 NOTE — TELEPHONE ENCOUNTER
8:05 AM    Reason for Call: Phone Call    Description: Patient is requesting a referral to a provider that is in [place], [place]. Please call her back to advise.    Was an appointment offered for this call? No  If yes : Appointment type              Date    Preferred method for responding to this message: Telephone Call  What is your phone number ? [phone]     If we cannot reach you directly, may we leave a detailed response at the number you provided? Yes    Can this message wait until your PCP/provider returns, if available today? Not applicable    Casie Gibbs

## 2025-01-30 DIAGNOSIS — I10 PRIMARY HYPERTENSION: ICD-10-CM

## 2025-01-30 RX ORDER — LISINOPRIL AND HYDROCHLOROTHIAZIDE 20; 25 MG/1; MG/1
1 TABLET ORAL DAILY
Qty: 90 TABLET | Refills: 1 | Status: SHIPPED | OUTPATIENT
Start: 2025-01-30

## 2025-01-30 RX ORDER — CLONIDINE HYDROCHLORIDE 0.1 MG/1
0.1 TABLET ORAL 2 TIMES DAILY
Qty: 180 TABLET | Refills: 1 | Status: SHIPPED | OUTPATIENT
Start: 2025-01-30

## 2025-01-30 NOTE — TELEPHONE ENCOUNTER
CLONIDINE 0.1MG TABLETS       Last Written Prescription Date:  10/30/2024  Last Fill Quantity: 180,   # refills: 1  Last Office Visit: 07/31/2024  Future Office visit:       Routing refill request to provider for review/approval because:    Antihypertensive Agents [id number]/30/2025 08:05 AM   Protocol Details Most recent blood pressure under 140/90 in past 12 months        LISINOPRIL-HCTZ 20/25MG TABLETS       Last Written Prescription Date:  11/01/2024  Last Fill Quantity: 90,   # refills: 1  /Last Office Visit: 07/31/2024  Future Office visit:       Routing refill request to provider for review/approval because:    Diuretics (Including Combos) Protocol [id number]/30/2025 08:05 AM   Protocol Details Most recent blood pressure under 140/90 in past 12 months    Potassium level on file in past 12 months    Medication is active on med list    Medication indicated for associated diagnosis    Has GFR on file in past 12 months and most recent value is normal    Recent (12 mo) or future (90 days) visit within the authorizing provider's specialty    Patient is age 18 or older    No active pregancy on record    No positive pregnancy test in past 12 months   ACE Inhibitors (Including Combos) Protocol Failed   Protocol Details Most recent blood pressure under 140/90 in past 12 months- Clinicial or Patient Reported        Kimberly Boecker, RN      /

## 2025-03-22 DIAGNOSIS — F41.8 DEPRESSION WITH ANXIETY: ICD-10-CM

## 2025-03-24 NOTE — TELEPHONE ENCOUNTER
REASON FOR VISIT: MS loaiza    DATE OF APPT: 4/21/2025   NOTES (FOR ALL VISITS) STATUS DETAILS   OFFICE NOTE from referring provider Received [facility]  Mike Em DO 8/22/2024   MEDICATION LIST N/A    IMAGING  (FOR ALL VISITS)     XR N/A    MRI (HEAD, NECK, SPINE) N/A    CT (HEAD, NECK, SPINE) N/A

## 2025-03-24 NOTE — TELEPHONE ENCOUNTER
SSRIs Protocol [id number]/22/2025 10:49 PM   Protocol Details PHQ-9 score less than 5 in past 6 months    Medication is active on med list and the sig matches. RN to manually verify dose and sig if red X/fail.    AURA-7 score of less than 5 in past 6 months.

## 2025-03-24 NOTE — TELEPHONE ENCOUNTER
Zoloft      Last Written Prescription Date:  7/31/24  Last Fill Quantity: 90,   # refills: 1  Last Office Visit: 7/31/24  Future Office visit:       Routing refill request to provider for review/approval because:

## 2025-03-25 RX ORDER — SERTRALINE HYDROCHLORIDE 100 MG/1
100 TABLET, FILM COATED ORAL
Qty: 90 TABLET | Refills: 1 | Status: SHIPPED | OUTPATIENT
Start: 2025-03-25

## 2025-04-08 ENCOUNTER — TELEPHONE (OUTPATIENT)
Dept: NEUROLOGY | Facility: CLINIC | Age: 65
End: 2025-04-08
Payer: COMMERCIAL

## 2025-04-08 NOTE — TELEPHONE ENCOUNTER
Called and spoke with patient. She stated her last MRI was done in 2020 [facility] in [facility]. She has been following with Dr. Matty Rodriguez [facility] [phone]. Writer sent requests to both locations for records.

## 2025-04-08 NOTE — TELEPHONE ENCOUNTER
Called and spoke with patient. Want to try to get images from last MRI patient had. Patient stated the last MRIs she had done were done 3-4 years ago in [place]. However, she was walking out the door, so writer agreed to call back later today.

## 2025-04-21 ENCOUNTER — OFFICE VISIT (OUTPATIENT)
Dept: NEUROLOGY | Facility: CLINIC | Age: 65
End: 2025-04-21
Payer: COMMERCIAL

## 2025-04-21 ENCOUNTER — PRE VISIT (OUTPATIENT)
Dept: NEUROLOGY | Facility: CLINIC | Age: 65
End: 2025-04-21

## 2025-04-21 VITALS
SYSTOLIC BLOOD PRESSURE: 124 MMHG | HEART RATE: 85 BPM | WEIGHT: 174 LBS | DIASTOLIC BLOOD PRESSURE: 88 MMHG | HEIGHT: 59 IN | BODY MASS INDEX: 35.08 KG/M2

## 2025-04-21 DIAGNOSIS — G35 MS (MULTIPLE SCLEROSIS) (H): ICD-10-CM

## 2025-04-21 DIAGNOSIS — R26.9 GAIT DISTURBANCE: Primary | ICD-10-CM

## 2025-04-21 RX ORDER — PREGABALIN 150 MG/1
150 CAPSULE ORAL 2 TIMES DAILY
COMMUNITY
Start: 2024-12-01

## 2025-04-21 RX ORDER — RIZATRIPTAN BENZOATE 10 MG/1
10 TABLET ORAL
COMMUNITY
Start: 2025-04-04

## 2025-04-21 ASSESSMENT — PAIN SCALES - GENERAL: PAINLEVEL_OUTOF10: SEVERE PAIN (9)

## 2025-04-21 NOTE — LETTER
4/21/2025       RE: Farzana William  [address] [place] [address]     Dear Dr. Em,    Thank you for referring your patient, Farzana William, to the Multiple Sclerosis Clinic at [facility] [place]. Please see a copy of my visit note below.    Referral source:   Mike Em, [facility]  [address]  [place] [address]    Chief complaint: Multiple sclerosis    History of the Present Illness: Ms. Farzana William is a 65 year old right-handed woman who is evaluated in the Multiple Sclerosis Clinic today at the request of Dr. Em for the purpose of establishing local care of her longstanding diagnosis of multiple sclerosis.    The patient relates that in 2006 she noted gradual onset of a right foot drop. Subsequently, she was evaluated at the [facility] in [place], [place] by a multiple sclerosis expert (Dr. Chan Bailey), and diagnosed with primary progressive multiple sclerosis. Subsequently, she has had insidious progression of predominantly right-sided weakness. This has reached a point where it is getting harder for her to get dressed independently and get around. She walks with a cane or a rolling walker. She does have an electric scooter, but is not using this a lot. If she goes to a store and they do not have a cart she can sit in, it is a challenge for her to get around.    Her other major symptomatic issue is difficulty with urinary incontinence. She had a sacral stimulator (Interstim) placed in December 2023 and initially this was helpful, but subsequently her symptoms have returned. She endorses urgency and urge associated incontinence. In the past, she took oxybutynin and thinks that this may have been somewhat helpful, but was advised not to take this medication by her urologist.     Recently, she has mainly been followed for these complaints by neurology and urology in the [facility], where she spends the winter. She is  "no longer seeing our colleague at [facility]. In 2022, she started disease modifying therapy with ocrelizumab. She states that she \"really can't tell the difference\" since starting this medication. Tolerance is reasonable, except that she usually develops a migraine headache after infusions.    She denies any concerns about increased frequency or severity of infections, and also denies any recent episodic changes in vision, balance, strength, or sensation suggestive of relapse of multiple sclerosis.    On review of systems, she relates that she had some blood tests recently that were concerning for thyroid disease (hypothyroidism). These were not available to me. I advised follow up with her primary care doctor.    Past Medical History:  1. Migraine  Currently these are infrequent, occurring a couple of times per year on average.  2. Depression  3. Hypertension  4. Hyperlipidemia    Medications:  Sertraline 100 mg po daily  Clonidine 0.1 mg po BID  Lisinopril-hydrochlorothiazide 20-25 mg po daily  Simvastatin 20 mg po daily  Pregabalin 150 mg po BID  Meloxicam 15 mg po BID  Trazodone 50 mg po qhs    Family History: She is not aware of any history of MS or autoimmune disease in first degree relatives.    Social History: The patient is . She formerly worked as a schoolteacher (computer science), but has been disabled from work since approximately age 49. She quit smoking about 20 years ago.    PHYSICAL EXAMINATION:  VITAL SIGNS: Blood pressure 124/88; pulse 85; weight 78.9 kg; height 1.49 m.  GENERAL: Obese, short-statured woman who presents to the examination accompanied by her , awake and alert and in no acute distress. She is generally bradyphrenic.    NEUROLOGIC EXAMINATION:  CRANIAL NERVES: Visual fields are full to confrontation.  Extraocular movements are intact with no internuclear ophthalmoplegia.  Facial strength is normal.  Palate elevation and tongue protrusion are normal.  POWER: There is " weakness in an upper motor neuron pattern with right deltoids, biceps, and triceps 4/5, wrist extensors normal, and finger extensors 4/5. Finger interossei are 4 on the right and 4+ on the left. Hip flexors grade 3 on the right and 5 on the left. Hamstrings and anterior tibialis grade 4- on the right and 4+ on the left.  REFLEXES: Reflexes are roughly symmetric and not pathologically increased.   MOTOR/CEREBELLAR: There is no appendicular ataxia  out of proportion to weakness on finger-to-nose testing.  Rapid alternating movements are within normal limits in the hands and fingers.  There is no pronator drift in the arms.  GAIT: The patient ambulates on a flat, level surface with a 3 point cane held in the right hand, without gross loss of postural stability. Findings on examination of gait are those of spastic paresis of the right lower limb.    Investigations: I reviewed the results of the most recent MRI imaging, with scans of the brain and cervical spine performed on 11/21/2022. The images are not available to me. Per report, cervical MRI did not show demyelinating changes in the cord. Brain MRI showed periventricular and juxtacortical white matter lesions, suggestive of diagnosis of demyelinating disease.    Assessment/plan:    1. Multiple sclerosis  I discussed her overall management with them.     In the ORATORIO trial of ocrelizumab for primary progressive MS (PPMS), this agent was shown to modestly reduce the likelihood of confirmed disability progression in people with PPMS aged 18-55. The trial did not include people over 55, as an earlier study of rituximab in PPMS suggested a lack of benefit in people 55-65.    I told her that, in my opinion, benefit from ocrelizumab in people with PPMS in her age group is speculative.  Nonetheless, as this is generally a relatively low risk intervention, some older people with PPMS do elect to try this therapy. As noted above, the agent is purely preventive and it is  not expected that she would feel improvement with the infusions.  Rather, the hope is to slow or halt further progression.    There are low but non-zero risks of serious infectious complications with ocrelizumab, and the risk: benefit ratio of this agent should be assessed regularly as she gets older. Currently, she indicates that she is inclined to stay on the medication and is scheduled for the next treatment in July, apparently prescribed by her neurologist in [place].    They ask about utility of MRI imaging.  I told them that in general, both her age and use of a high efficacy therapy suggest a low likelihood of new MRI activity.  Moreover, she is already on the only approved therapy for PPMS, and I think it is unlikely that new imaging would result in a change in her management.  In this context, they are comfortable with deferring imaging for now.    I will see her back for a review in 6 months.  In the interim, we will address her other symptomatic concerns as outlined further below.    2. Gait disturbance  The patient indicates that walking is becoming more effortful.  I discussed dalfampridine with them. This agent has been shown to improve walking speed in a percentage of people with gait dysfunction related to multiple sclerosis.  Anecdotally, responders sometimes report increased exercise tolerance and improved stability.  For reasons that are not particularly clear, only about 40% of people with MS respond to this agent.  Accordingly, when I try this medication I advise patients to take the medication for 1 month.  If there is clear symptomatic improvement in gait, it can certainly be continued.  Otherwise, I advise going off of the medication for 2 to 4 weeks to compare how walking is doing off the medication.  If there is truly no apparent difference with or without the drug, it can then be discontinued.    At present, the patient is not sure that she wants to take more medications.  She has not been  to physical therapy in some time, and I do think that this could be helpful in improving her gait and reducing her risk of falls.  I also strongly suspect an element of deconditioning, for which an exercise program could be helpful. I did place a referral for physical therapy today.     We can reconsider the possibility of dalfampridine at her next visit.  She has had borderline low kidney function in the past, which could be a contraindication to the medication if this worsens, and we would need to follow that over time if she were to take this medication on a long-term basis.    3. Urge associated urinary incontinence  Previous urodynamic testing was apparently more suggestive of urinary retention, although the patient does not particularly endorse this symptom and is not having difficulty with urinary tract infections.  Her description of her symptoms, on the other hand, could be suggestive of an element of detrusor overactivity.    I discussed with them that we could give her an empiric trial of an anticholinergic such as solifenacin. Mirabegron could be another option.  We would need to watch closely for worsened urinary retention, and I would only continue one of these medications if there was a clear symptomatic benefit.    Presently, she would like to hold off on additional medications and discuss options for different programming of her sacral stimulator with her urologist, which is fine.  We can re-address at any point in the future, as desired.    I spent a total of 57 minutes on patient care activities related to this encounter on the date of service, including time spent in reviewing the chart, obtaining history and examination from, and in counseling the patient.    The longitudinal plans of care for the diagnoses as documented were addressed during this visit. Due to the added complexity in care, I will continue to support the patient in subsequent management and with ongoing continuity of  care.    Again, thank you for allowing me to participate in the care of your patient.      Sincerely,    Manjinder Del Angel MD  [facility]    Cc:  EVA Starr MD ([facility])  Maximino Green MD ([facility])  Patient

## 2025-04-21 NOTE — Clinical Note
4/21/2025      Farzana William  [address] [place] [address]      Dear Colleague,    Thank you for referring your patient, Farzana William, to the [facility] [place]. Please see a copy of my visit note below.    No notes on file    Again, thank you for allowing me to participate in the care of your patient.        Sincerely,        Manjinder Del Angel MD    Electronically signed

## 2025-04-21 NOTE — PATIENT INSTRUCTIONS
Proceed with next Ocrevus infusion in July as scheduled    2.  We will refer you to physical therapy for evaluation and management of gait disturbance    3. We discussed that we could consider an empiric trial of a medication to see if this helps with urinary urgency. These include solifenacin (Vesicare) and mirabegron (Myrbetriq).    4.  Return to clinic in 6 months

## 2025-04-23 NOTE — PROGRESS NOTES
{PROVIDER CHARTING PREFERENCE:739010}    Ashlie Yanes is a 65 year old, presenting for the following health issues:  Anxiety and Hypertension        2025    10:44 AM   Additional Questions   Roomed by kiarra PINEDO          Hypertension Follow-up    Do you check your blood pressure regularly outside of the clinic? No   Are you following a low salt diet? No  Are your blood pressures ever more than 140 on the top number (systolic) OR more   than 90 on the bottom number (diastolic), for example 140/90? No    BP Readings from Last 2 Encounters:   25 130/84   25 124/88     Anxiety   How are you doing with your anxiety since your last visit? Worsened   Are you having other symptoms that might be associated with anxiety? Yes:  ***  Have you had a significant life event? Health Concerns   Are you feeling depressed? Yes:  ***  Do you have any concerns with your use of alcohol or other drugs? No    Social History     Tobacco Use    Smoking status: Former     Current packs/day: 0.00     Average packs/day: 1 pack/day for 10.0 years (10.0 ttl pk-yrs)     Types: Cigarettes     Quit date: 6/10/2006     Years since quittin.8    Smokeless tobacco: Never    Tobacco comments:     quit 2004   Vaping Use    Vaping status: Never Used   Substance Use Topics    Alcohol use: Yes     Comment: daily    Drug use: Not Currently     Types: Marijuana, Anabolic steroids, Hydrocodone, Morphine         2024     4:11 PM 2025    10:39 AM   AURA-7 SCORE   Total Score 11 (moderate anxiety) 6 (mild anxiety)   Total Score 11 6        Patient-reported         2023     9:56 AM   PHQ   PHQ-9 Total Score 11   Q9: Thoughts of better off dead/self-harm past 2 weeks Not at all     {Last PHQ9 or GAD7 Responses (Optional):759652}    {additonal problems for provider to add (Optional):797172}    {ROS Picklists (Optional):742680}      Objective    /84 (BP Location: Left arm, Patient Position: Sitting, Cuff  "Size: Adult Regular)   Pulse 58   Temp 97.1  F (36.2  C) (Tympanic)   Resp 18   Ht 1.486 m (4' 10.5\")   Wt 79.6 kg (175 lb 6.4 oz)   SpO2 96%   BMI 36.03 kg/m    Body mass index is 36.03 kg/m .  Physical Exam   {Exam List (Optional):277516}    {Diagnostic Test Results (Optional):966467}        Signed Electronically by: Mike Em DO  {Email feedback regarding this note to [email or web address]   :[id number]}  " "Vaping status: Never Used   Substance Use Topics    Alcohol use: Yes     Comment: daily    Drug use: Not Currently     Types: Marijuana, Anabolic steroids, Hydrocodone, Morphine         7/26/2024     4:11 PM 4/29/2025    10:39 AM   AURA-7 SCORE   Total Score 11 (moderate anxiety) 6 (mild anxiety)   Total Score 11 6        Patient-reported         4/24/2023     9:56 AM   PHQ   PHQ-9 Total Score 11   Q9: Thoughts of better off dead/self-harm past 2 weeks Not at all                   Objective    /84 (BP Location: Left arm, Patient Position: Sitting, Cuff Size: Adult Regular)   Pulse 58   Temp 97.1  F (36.2  C) (Tympanic)   Resp 18   Ht 1.486 m (4' 10.5\")   Wt 79.6 kg (175 lb 6.4 oz)   SpO2 96%   BMI 36.03 kg/m    Body mass index is 36.03 kg/m .  Physical Exam  Constitutional:       General: She is not in acute distress.     Appearance: Normal appearance.   Eyes:      Conjunctiva/sclera: Conjunctivae normal.      Pupils: Pupils are equal, round, and reactive to light.   Cardiovascular:      Rate and Rhythm: Normal rate and regular rhythm.      Heart sounds: Normal heart sounds. No murmur heard.  Pulmonary:      Effort: Pulmonary effort is normal.      Breath sounds: Normal breath sounds.   Abdominal:      General: Bowel sounds are normal.      Palpations: Abdomen is soft.      Tenderness: There is no abdominal tenderness.   Musculoskeletal:      Right lower leg: No edema.      Left lower leg: No edema.   Lymphadenopathy:      Cervical: No cervical adenopathy.   Neurological:      General: No focal deficit present.      Mental Status: She is alert and oriented to person, place, and time.      Coordination: Coordination normal.                    Signed Electronically by: Mike Em DO    "

## 2025-04-23 NOTE — PROGRESS NOTES
"Referral source:   Mike Em DO  [address] [address]    Chief complaint: Multiple sclerosis    History of the Present Illness: Ms. Farzana William is a 65 year old right-handed woman who is evaluated in the Multiple Sclerosis Clinic today at the request of Dr. Em for the purpose of establishing local care of her longstanding diagnosis of multiple sclerosis.    The patient relates that in 2006 she noted gradual onset of a right foot drop. Subsequently, she was evaluated at the [facility] in [place], [place] by a multiple sclerosis expert (Dr. Chan Bailey), and diagnosed with primary progressive multiple sclerosis. Subsequently, she has had insidious progression of predominantly right-sided weakness. This has reached a point where it is getting harder for her to get dressed independently and get around. She walks with a cane or a rolling walker. She does have an electric scooter, but is not using this a lot. If she goes to a store and they do not have a cart she can sit in, it is a challenge for her to get around.    Her other major symptomatic issue is difficulty with urinary incontinence. She had a sacral stimulator (Interstim) placed in December 2023 and initially this was helpful, but subsequently her symptoms have returned. She endorses urgency and urge associated incontinence. In the past, she took oxybutynin and thinks that this may have been somewhat helpful, but was advised not to take this medication by her urologist.     Recently, she has mainly been followed for these complaints by neurology and urology in the [facility], where she spends the winter. She is no longer seeing our colleague at [facility]. In 2022, she started disease modifying therapy with ocrelizumab. She states that she \"really can't tell the difference\" since starting this medication. Tolerance is reasonable, except that she usually develops a migraine headache after " infusions.    She denies any concerns about increased frequency or severity of infections, and also denies any recent episodic changes in vision, balance, strength, or sensation suggestive of relapse of multiple sclerosis.    On review of systems, she relates that she had some blood tests recently that were concerning for thyroid disease (hypothyroidism). These were not available to me. I advised follow up with her primary care doctor.    Past Medical History:  1. Migraine  Currently these are infrequent, occurring a couple of times per year on average.  2. Depression  3. Hypertension  4. Hyperlipidemia    Medications:  Sertraline 100 mg po daily  Clonidine 0.1 mg po BID  Lisinopril-hydrochlorothiazide 20-25 mg po daily  Simvastatin 20 mg po daily  Pregabalin 150 mg po BID  Meloxicam 15 mg po BID  Trazodone 50 mg po qhs    Family History: She is not aware of any history of MS or autoimmune disease in first degree relatives.    Social History: The patient is . 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 She quit smoking about 20 years ago.    PHYSICAL EXAMINATION:  VITAL SIGNS: Blood pressure 124/88; pulse 85; weight 78.9 kg; height 1.49 m.  GENERAL: Obese, short-statured woman who presents to the examination accompanied by her , awake and alert and in no acute distress. She is generally bradyphrenic.    NEUROLOGIC EXAMINATION:  CRANIAL NERVES: Visual fields are full to confrontation.  Extraocular movements are intact with no internuclear ophthalmoplegia.  Facial strength is normal.  Palate elevation and tongue protrusion are normal.  POWER: There is weakness in an upper motor neuron pattern with right deltoids, biceps, and triceps 4/5, wrist extensors normal, and finger extensors 4/5. Finger interossei are 4 on the right and 4+ on the left. Hip flexors grade 3 on the right and 5 on the left. Hamstrings and anterior tibialis  grade 4- on the right and 4+ on the left.  REFLEXES: Reflexes are roughly symmetric and not pathologically increased.   MOTOR/CEREBELLAR: There is no appendicular ataxia  out of proportion to weakness on finger-to-nose testing.  Rapid alternating movements are within normal limits in the hands and fingers.  There is no pronator drift in the arms.  GAIT: The patient ambulates on a flat, level surface with a 3 point cane held in the right hand, without gross loss of postural stability. Findings on examination of gait are those of spastic paresis of the right lower limb.    Investigations: I reviewed the results of the most recent MRI imaging, with scans of the brain and cervical spine performed on 11/21/2022. The images are not available to me. Per report, cervical MRI did not show demyelinating changes in the cord. Brain MRI showed periventricular and juxtacortical white matter lesions, suggestive of diagnosis of demyelinating disease.    Assessment/plan:    1. Multiple sclerosis  I discussed her overall management with them.     In the ORATORIO trial of ocrelizumab for primary progressive MS (PPMS), this agent was shown to modestly reduce the likelihood of confirmed disability progression in people with PPMS aged 18-55. The trial did not include people over 55, as an earlier study of rituximab in PPMS suggested a lack of benefit in people 55-65.    I told her that, in my opinion, benefit from ocrelizumab in people with PPMS in her age group is speculative.  Nonetheless, as this is generally a relatively low risk intervention, some older people with PPMS do elect to try this therapy. As noted above, the agent is purely preventive and it is not expected that she would feel improvement with the infusions.  Rather, the hope is to slow or halt further progression.    There are low but non-zero risks of serious infectious complications with ocrelizumab, and the risk: benefit ratio of this agent should be assessed  regularly as she gets older. Currently, she indicates that she is inclined to stay on the medication and is scheduled for the next treatment in July, apparently prescribed by her neurologist in [place].    They ask about utility of MRI imaging.  I told them that in general, both her age and use of a high efficacy therapy suggest a low likelihood of new MRI activity.  Moreover, she is already on the only approved therapy for PPMS, and I think it is unlikely that new imaging would result in a change in her management.  In this context, they are comfortable with deferring imaging for now.    I will see her back for a review in 6 months.  In the interim, we will address her other symptomatic concerns as outlined further below.    2. Gait disturbance  The patient indicates that walking is becoming more effortful.  I discussed dalfampridine with them. This agent has been shown to improve walking speed in a percentage of people with gait dysfunction related to multiple sclerosis.  Anecdotally, responders sometimes report increased exercise tolerance and improved stability.  For reasons that are not particularly clear, only about 40% of people with MS respond to this agent.  Accordingly, when I try this medication I advise patients to take the medication for 1 month.  If there is clear symptomatic improvement in gait, it can certainly be continued.  Otherwise, I advise going off of the medication for 2 to 4 weeks to compare how walking is doing off the medication.  If there is truly no apparent difference with or without the drug, it can then be discontinued.    At present, the patient is not sure that she wants to take more medications.  She has not been to physical therapy in some time, and I do think that this could be helpful in improving her gait and reducing her risk of falls.  I also strongly suspect an element of deconditioning, for which an exercise program could be helpful. I did place a referral for physical  therapy today.     We can reconsider the possibility of dalfampridine at her next visit.  She has had borderline low kidney function in the past, which could be a contraindication to the medication if this worsens, and we would need to follow that over time if she were to take this medication on a long-term basis.    3. Urge associated urinary incontinence  Previous urodynamic testing was apparently more suggestive of urinary retention, although the patient does not particularly endorse this symptom and is not having difficulty with urinary tract infections.  Her description of her symptoms, on the other hand, could be suggestive of an element of detrusor overactivity.    I discussed with them that we could give her an empiric trial of an anticholinergic such as solifenacin. Mirabegron could be another option.  We would need to watch closely for worsened urinary retention, and I would only continue one of these medications if there was a clear symptomatic benefit.    Presently, she would like to hold off on additional medications and discuss options for different programming of her sacral stimulator with her urologist, which is fine.  We can re-address at any point in the future, as desired.    I spent a total of 57 minutes on patient care activities related to this encounter on the date of service, including time spent in reviewing the chart, obtaining history and examination from, and in counseling the patient.    The longitudinal plans of care for the diagnoses as documented were addressed during this visit. Due to the added complexity in care, I will continue to support the patient in subsequent management and with ongoing continuity of care.

## 2025-04-29 ENCOUNTER — OFFICE VISIT (OUTPATIENT)
Dept: FAMILY MEDICINE | Facility: OTHER | Age: 65
End: 2025-04-29
Attending: FAMILY MEDICINE
Payer: COMMERCIAL

## 2025-04-29 VITALS
BODY MASS INDEX: 35.36 KG/M2 | DIASTOLIC BLOOD PRESSURE: 84 MMHG | RESPIRATION RATE: 18 BRPM | WEIGHT: 175.4 LBS | HEIGHT: 59 IN | HEART RATE: 58 BPM | SYSTOLIC BLOOD PRESSURE: 130 MMHG | OXYGEN SATURATION: 96 % | TEMPERATURE: 97.1 F

## 2025-04-29 DIAGNOSIS — M19.90 ARTHRITIS: ICD-10-CM

## 2025-04-29 DIAGNOSIS — G35 MS (MULTIPLE SCLEROSIS) (H): ICD-10-CM

## 2025-04-29 DIAGNOSIS — F41.8 INSOMNIA SECONDARY TO DEPRESSION WITH ANXIETY: ICD-10-CM

## 2025-04-29 DIAGNOSIS — Z23 ENCOUNTER FOR VACCINATION: ICD-10-CM

## 2025-04-29 DIAGNOSIS — Z12.31 ENCOUNTER FOR SCREENING MAMMOGRAM FOR BREAST CANCER: ICD-10-CM

## 2025-04-29 DIAGNOSIS — E21.3 HYPERPARATHYROIDISM: Primary | ICD-10-CM

## 2025-04-29 DIAGNOSIS — E78.5 DYSLIPIDEMIA: ICD-10-CM

## 2025-04-29 DIAGNOSIS — F51.05 INSOMNIA SECONDARY TO DEPRESSION WITH ANXIETY: ICD-10-CM

## 2025-04-29 DIAGNOSIS — I10 PRIMARY HYPERTENSION: ICD-10-CM

## 2025-04-29 DIAGNOSIS — F41.8 DEPRESSION WITH ANXIETY: ICD-10-CM

## 2025-04-29 LAB
ALBUMIN SERPL BCG-MCNC: 4.2 G/DL (ref 3.5–5.2)
ALP SERPL-CCNC: 148 U/L (ref 40–150)
ALT SERPL W P-5'-P-CCNC: 28 U/L (ref 0–50)
ANION GAP SERPL CALCULATED.3IONS-SCNC: 12 MMOL/L (ref 7–15)
AST SERPL W P-5'-P-CCNC: 22 U/L (ref 0–45)
BASOPHILS # BLD AUTO: 0.1 10E3/UL (ref 0–0.2)
BASOPHILS NFR BLD AUTO: 1 %
BILIRUB SERPL-MCNC: 0.2 MG/DL
BUN SERPL-MCNC: 28.8 MG/DL (ref 8–23)
CALCIUM SERPL-MCNC: 10.9 MG/DL (ref 8.8–10.4)
CHLORIDE SERPL-SCNC: 107 MMOL/L (ref 98–107)
CHOLEST SERPL-MCNC: 180 MG/DL
CREAT SERPL-MCNC: 1.02 MG/DL (ref 0.51–0.95)
EGFRCR SERPLBLD CKD-EPI 2021: 60 ML/MIN/1.73M2
EOSINOPHIL # BLD AUTO: 0.4 10E3/UL (ref 0–0.7)
EOSINOPHIL NFR BLD AUTO: 5 %
ERYTHROCYTE [DISTWIDTH] IN BLOOD BY AUTOMATED COUNT: 13.5 % (ref 10–15)
FASTING STATUS PATIENT QL REPORTED: YES
FASTING STATUS PATIENT QL REPORTED: YES
GLUCOSE SERPL-MCNC: 97 MG/DL (ref 70–99)
HCO3 SERPL-SCNC: 24 MMOL/L (ref 22–29)
HCT VFR BLD AUTO: 40.4 % (ref 35–47)
HDLC SERPL-MCNC: 84 MG/DL
HGB BLD-MCNC: 13.4 G/DL (ref 11.7–15.7)
IMM GRANULOCYTES # BLD: 0 10E3/UL
IMM GRANULOCYTES NFR BLD: 0 %
LDLC SERPL CALC-MCNC: 80 MG/DL
LYMPHOCYTES # BLD AUTO: 1.7 10E3/UL (ref 0.8–5.3)
LYMPHOCYTES NFR BLD AUTO: 24 %
MAGNESIUM SERPL-MCNC: 2 MG/DL (ref 1.7–2.3)
MCH RBC QN AUTO: 32 PG (ref 26.5–33)
MCHC RBC AUTO-ENTMCNC: 33.2 G/DL (ref 31.5–36.5)
MCV RBC AUTO: 96 FL (ref 78–100)
MONOCYTES # BLD AUTO: 0.8 10E3/UL (ref 0–1.3)
MONOCYTES NFR BLD AUTO: 11 %
NEUTROPHILS # BLD AUTO: 4 10E3/UL (ref 1.6–8.3)
NEUTROPHILS NFR BLD AUTO: 58 %
NONHDLC SERPL-MCNC: 96 MG/DL
NRBC # BLD AUTO: 0 10E3/UL
NRBC BLD AUTO-RTO: 0 /100
PHOSPHATE SERPL-MCNC: 2.2 MG/DL (ref 2.5–4.5)
PLATELET # BLD AUTO: 207 10E3/UL (ref 150–450)
POTASSIUM SERPL-SCNC: 4.3 MMOL/L (ref 3.4–5.3)
PROT SERPL-MCNC: 7.2 G/DL (ref 6.4–8.3)
PTH-INTACT SERPL-MCNC: 75 PG/ML (ref 15–65)
RBC # BLD AUTO: 4.19 10E6/UL (ref 3.8–5.2)
SODIUM SERPL-SCNC: 143 MMOL/L (ref 135–145)
TRIGL SERPL-MCNC: 80 MG/DL
TSH SERPL DL<=0.005 MIU/L-ACNC: 2.23 UIU/ML (ref 0.3–4.2)
VIT D+METAB SERPL-MCNC: 104 NG/ML (ref 20–50)
WBC # BLD AUTO: 6.9 10E3/UL (ref 4–11)

## 2025-04-29 PROCEDURE — 85004 AUTOMATED DIFF WBC COUNT: CPT | Mod: ZL | Performed by: FAMILY MEDICINE

## 2025-04-29 PROCEDURE — 84100 ASSAY OF PHOSPHORUS: CPT | Mod: ZL | Performed by: FAMILY MEDICINE

## 2025-04-29 PROCEDURE — 83970 ASSAY OF PARATHORMONE: CPT | Mod: ZL | Performed by: FAMILY MEDICINE

## 2025-04-29 PROCEDURE — 82306 VITAMIN D 25 HYDROXY: CPT | Mod: ZL | Performed by: FAMILY MEDICINE

## 2025-04-29 PROCEDURE — 36415 COLL VENOUS BLD VENIPUNCTURE: CPT | Mod: ZL | Performed by: FAMILY MEDICINE

## 2025-04-29 PROCEDURE — G0009 ADMIN PNEUMOCOCCAL VACCINE: HCPCS

## 2025-04-29 PROCEDURE — G0463 HOSPITAL OUTPT CLINIC VISIT: HCPCS | Mod: 25

## 2025-04-29 PROCEDURE — 80061 LIPID PANEL: CPT | Mod: ZL | Performed by: FAMILY MEDICINE

## 2025-04-29 PROCEDURE — 84443 ASSAY THYROID STIM HORMONE: CPT | Mod: ZL | Performed by: FAMILY MEDICINE

## 2025-04-29 PROCEDURE — 82310 ASSAY OF CALCIUM: CPT | Mod: ZL | Performed by: FAMILY MEDICINE

## 2025-04-29 PROCEDURE — 83735 ASSAY OF MAGNESIUM: CPT | Mod: ZL | Performed by: FAMILY MEDICINE

## 2025-04-29 RX ORDER — SIMVASTATIN 20 MG
20 TABLET ORAL AT BEDTIME
Qty: 90 TABLET | Refills: 3 | Status: SHIPPED | OUTPATIENT
Start: 2025-04-29

## 2025-04-29 RX ORDER — TRAZODONE HYDROCHLORIDE 50 MG/1
50 TABLET ORAL AT BEDTIME
Qty: 90 TABLET | Refills: 0 | Status: SHIPPED | OUTPATIENT
Start: 2025-04-29

## 2025-04-29 RX ORDER — MELOXICAM 15 MG/1
15 TABLET ORAL DAILY PRN
Qty: 30 TABLET | Refills: 3 | Status: SHIPPED | OUTPATIENT
Start: 2025-04-29

## 2025-04-29 RX ORDER — CLONIDINE HYDROCHLORIDE 0.1 MG/1
0.1 TABLET ORAL 2 TIMES DAILY
Qty: 180 TABLET | Refills: 1 | Status: SHIPPED | OUTPATIENT
Start: 2025-04-29

## 2025-04-29 RX ORDER — SERTRALINE HYDROCHLORIDE 100 MG/1
100 TABLET, FILM COATED ORAL
Qty: 90 TABLET | Refills: 1 | Status: SHIPPED | OUTPATIENT
Start: 2025-04-29

## 2025-04-29 RX ORDER — LISINOPRIL AND HYDROCHLOROTHIAZIDE 20; 25 MG/1; MG/1
1 TABLET ORAL DAILY
Qty: 90 TABLET | Refills: 1 | Status: SHIPPED | OUTPATIENT
Start: 2025-04-29

## 2025-04-29 ASSESSMENT — ANXIETY QUESTIONNAIRES
7. FEELING AFRAID AS IF SOMETHING AWFUL MIGHT HAPPEN: NOT AT ALL
2. NOT BEING ABLE TO STOP OR CONTROL WORRYING: SEVERAL DAYS
GAD7 TOTAL SCORE: 6
GAD7 TOTAL SCORE: 6
5. BEING SO RESTLESS THAT IT IS HARD TO SIT STILL: NOT AT ALL
3. WORRYING TOO MUCH ABOUT DIFFERENT THINGS: SEVERAL DAYS
6. BECOMING EASILY ANNOYED OR IRRITABLE: MORE THAN HALF THE DAYS
4. TROUBLE RELAXING: NOT AT ALL
GAD7 TOTAL SCORE: 6
IF YOU CHECKED OFF ANY PROBLEMS ON THIS QUESTIONNAIRE, HOW DIFFICULT HAVE THESE PROBLEMS MADE IT FOR YOU TO DO YOUR WORK, TAKE CARE OF THINGS AT HOME, OR GET ALONG WITH OTHER PEOPLE: SOMEWHAT DIFFICULT
1. FEELING NERVOUS, ANXIOUS, OR ON EDGE: MORE THAN HALF THE DAYS
7. FEELING AFRAID AS IF SOMETHING AWFUL MIGHT HAPPEN: NOT AT ALL
8. IF YOU CHECKED OFF ANY PROBLEMS, HOW DIFFICULT HAVE THESE MADE IT FOR YOU TO DO YOUR WORK, TAKE CARE OF THINGS AT HOME, OR GET ALONG WITH OTHER PEOPLE?: SOMEWHAT DIFFICULT

## 2025-05-07 ENCOUNTER — TELEPHONE (OUTPATIENT)
Dept: FAMILY MEDICINE | Facility: OTHER | Age: 65
End: 2025-05-07

## 2025-05-07 NOTE — TELEPHONE ENCOUNTER
Pt Spouse came in and gave a letter to give to Dr. Em about a letter regarding her health condition to bring to the [employer]. Placed letter in Providers wall bin. Pt would like the letter from Dr. Em sent to them with the stapled envelope provided.

## 2025-05-21 ENCOUNTER — THERAPY VISIT (OUTPATIENT)
Dept: PHYSICAL THERAPY | Facility: HOSPITAL | Age: 65
End: 2025-05-21
Attending: PSYCHIATRY & NEUROLOGY
Payer: COMMERCIAL

## 2025-05-21 DIAGNOSIS — G35 MS (MULTIPLE SCLEROSIS) (H): ICD-10-CM

## 2025-05-21 DIAGNOSIS — R26.9 GAIT DISTURBANCE: Primary | ICD-10-CM

## 2025-05-21 PROCEDURE — 97110 THERAPEUTIC EXERCISES: CPT | Mod: GP,CQ

## 2025-05-21 PROCEDURE — 97112 NEUROMUSCULAR REEDUCATION: CPT | Mod: GP,CQ

## 2025-05-28 ENCOUNTER — THERAPY VISIT (OUTPATIENT)
Dept: PHYSICAL THERAPY | Facility: HOSPITAL | Age: 65
End: 2025-05-28
Attending: PSYCHIATRY & NEUROLOGY
Payer: COMMERCIAL

## 2025-05-28 DIAGNOSIS — R26.9 GAIT DISTURBANCE: Primary | ICD-10-CM

## 2025-05-28 DIAGNOSIS — G35 MS (MULTIPLE SCLEROSIS) (H): ICD-10-CM

## 2025-05-28 PROCEDURE — 97110 THERAPEUTIC EXERCISES: CPT | Mod: GP,CQ

## 2025-05-30 ENCOUNTER — RESULTS FOLLOW-UP (OUTPATIENT)
Dept: UROLOGY | Facility: OTHER | Age: 65
End: 2025-05-30

## 2025-05-30 ENCOUNTER — LAB (OUTPATIENT)
Dept: LAB | Facility: OTHER | Age: 65
End: 2025-05-30
Payer: COMMERCIAL

## 2025-05-30 DIAGNOSIS — R32 URINARY INCONTINENCE, UNSPECIFIED TYPE: ICD-10-CM

## 2025-05-30 LAB
ALBUMIN UR-MCNC: NEGATIVE MG/DL
APPEARANCE UR: CLEAR
BILIRUB UR QL STRIP: NEGATIVE
COLOR UR AUTO: ABNORMAL
GLUCOSE UR STRIP-MCNC: NEGATIVE MG/DL
HGB UR QL STRIP: NEGATIVE
KETONES UR STRIP-MCNC: NEGATIVE MG/DL
LEUKOCYTE ESTERASE UR QL STRIP: ABNORMAL
NITRATE UR QL: NEGATIVE
PH UR STRIP: 7 [PH] (ref 5–9)
SP GR UR STRIP: 1.01 (ref 1–1.03)
UROBILINOGEN UR STRIP-MCNC: NORMAL MG/DL

## 2025-05-30 PROCEDURE — 81003 URINALYSIS AUTO W/O SCOPE: CPT | Mod: ZL

## 2025-06-07 ENCOUNTER — HEALTH MAINTENANCE LETTER (OUTPATIENT)
Age: 65
End: 2025-06-07

## 2025-06-11 ENCOUNTER — THERAPY VISIT (OUTPATIENT)
Dept: PHYSICAL THERAPY | Facility: HOSPITAL | Age: 65
End: 2025-06-11
Attending: FAMILY MEDICINE
Payer: COMMERCIAL

## 2025-06-11 DIAGNOSIS — R26.9 GAIT DISTURBANCE: Primary | ICD-10-CM

## 2025-06-11 DIAGNOSIS — G35 MS (MULTIPLE SCLEROSIS) (H): ICD-10-CM

## 2025-06-11 PROCEDURE — 97110 THERAPEUTIC EXERCISES: CPT | Mod: GP,CQ

## 2025-06-23 DIAGNOSIS — R32 URINARY INCONTINENCE, UNSPECIFIED TYPE: Primary | ICD-10-CM

## 2025-07-01 DIAGNOSIS — F51.05 INSOMNIA SECONDARY TO DEPRESSION WITH ANXIETY: ICD-10-CM

## 2025-07-01 DIAGNOSIS — F41.8 INSOMNIA SECONDARY TO DEPRESSION WITH ANXIETY: ICD-10-CM

## 2025-07-01 RX ORDER — TRAZODONE HYDROCHLORIDE 50 MG/1
50 TABLET ORAL AT BEDTIME
Qty: 90 TABLET | Refills: 3 | Status: SHIPPED | OUTPATIENT
Start: 2025-07-01

## 2025-07-01 NOTE — TELEPHONE ENCOUNTER
Trazodone       Last Written Prescription Date:  4/29/25  Last Fill Quantity: 90,   # refills: 0  Last Office Visit: 4/29/25  Future Office visit:    Next 5 appointments (look out 90 days)      Jul 07, 2025 11:15 AM  (Arrive by 11:00 AM)  Return Visit with [facility] [address]  [place] [address]  [phone]             Routing refill request to provider for review/approval because:

## 2025-07-01 NOTE — TELEPHONE ENCOUNTER
Serotonin Modulators [id number]/01/2025 03:49 PM   Protocol Details PHQ-9 score less than 5 in past 6 months.    AURA-7 score of less than 5 in past 6 months.

## 2025-07-07 ENCOUNTER — LAB (OUTPATIENT)
Dept: LAB | Facility: OTHER | Age: 65
End: 2025-07-07
Payer: COMMERCIAL

## 2025-07-07 ENCOUNTER — OFFICE VISIT (OUTPATIENT)
Dept: UROLOGY | Facility: OTHER | Age: 65
End: 2025-07-07
Payer: COMMERCIAL

## 2025-07-07 VITALS
SYSTOLIC BLOOD PRESSURE: 128 MMHG | HEART RATE: 56 BPM | DIASTOLIC BLOOD PRESSURE: 90 MMHG | TEMPERATURE: 97.1 F | OXYGEN SATURATION: 98 % | RESPIRATION RATE: 18 BRPM

## 2025-07-07 DIAGNOSIS — N39.41 URGE INCONTINENCE: ICD-10-CM

## 2025-07-07 DIAGNOSIS — Z87.898 HISTORY OF URINARY RETENTION: Primary | ICD-10-CM

## 2025-07-07 DIAGNOSIS — R32 URINARY INCONTINENCE, UNSPECIFIED TYPE: ICD-10-CM

## 2025-07-07 LAB
ALBUMIN UR-MCNC: NEGATIVE MG/DL
APPEARANCE UR: CLEAR
BILIRUB UR QL STRIP: NEGATIVE
COLOR UR AUTO: YELLOW
GLUCOSE UR STRIP-MCNC: NEGATIVE MG/DL
HGB UR QL STRIP: NEGATIVE
KETONES UR STRIP-MCNC: NEGATIVE MG/DL
LEUKOCYTE ESTERASE UR QL STRIP: NEGATIVE
NITRATE UR QL: NEGATIVE
PH UR STRIP: 6.5 [PH] (ref 5–9)
SP GR UR STRIP: 1.01 (ref 1–1.03)
UROBILINOGEN UR STRIP-MCNC: NORMAL MG/DL

## 2025-07-07 PROCEDURE — 81003 URINALYSIS AUTO W/O SCOPE: CPT | Mod: ZL

## 2025-07-07 PROCEDURE — G0463 HOSPITAL OUTPT CLINIC VISIT: HCPCS | Mod: 25

## 2025-07-07 PROCEDURE — 51798 US URINE CAPACITY MEASURE: CPT

## 2025-07-07 ASSESSMENT — PAIN SCALES - GENERAL: PAINLEVEL_OUTOF10: MODERATE PAIN (5)

## 2025-07-07 NOTE — PATIENT INSTRUCTIONS
May consider twice daily metamucil to bulk stools. Monitor carefully for constipation. Alternatively you can increase your dietary fiber. I would continue with Miralax 1/2 cap daily.  Avoid bladder irritants including alcohol, caffeine, heavily seasoned foods.  If no improvement in stool leakage may adjust Interstim settings and/or follow up with primary care.  Follow up with Urology in [place] as schedule, I am happy to see you here in clinic if you have changes in your urinary symptoms that you would like addressed. [phone].

## 2025-07-07 NOTE — PROGRESS NOTES
Chief Complaint: Follow Up (1 mo, urinary incontinence/)      HPI: Ms. Farzana William is a 65 year old year old female presenting today 2025 in follow up of urinary incontinence.    She was previously seen by me on 25. At that visit it was recommended that patient start bowel regimen, avoid bladder irritants such as alcohol, caffeine, heavily seasoned foods.     Today patient endorses improvement of symptoms. She has been utilizing Metamucil and MiraLAX on an alternating basis.  She has also been using a probiotic under her tongue.  She reports her urinary symptoms have improved significantly in which she is only having leakage every 2 to 3 days.  She does have leakage of stool daily.  She does note this has improved since adjusting her InterStim settings.  She reports bowel movements that are formed every 1 to 2 days.  She is drinking 64 to 100 ounces of water Dayrit daily.  Drinking 1 to 2 cups of coffee daily.  He has 2 alcohol containing beverages every 2 to 3 days.  Urinary leakage is primarily urge incontinence rather than stress.  Past Medical History:   Diagnosis Date    Alcohol abuse 2011    stopped     Arthritis     Cerebral infarction (H) 10/2007    Chronic kidney disease 2011    Congestive heart failure (H) 2006    Depressive disorder     Heart disease 2006    History of blood transfusion 1978    Hyperplastic colon polyp 2011    MS (multiple sclerosis) 2011    primary progressive MS dx'd     Tobacco abuse 2011    quit     Unspecified essential hypertension 2004    Unspecified hypertensive heart disease with heart failure(402.91) 2004       Past Surgical History:   Procedure Laterality Date    ABDOMEN SURGERY  1978,1986         section X2      COLONOSCOPY  2009    due     COLONOSCOPY - HIM SCAN N/A 2016    Dr Pruett, [facility] - repeat in 5 years    COLONOSCOPY - HIM SCAN N/A 2023     Colonoscopy 1/2023    ENT SURGERY      Broken nose    GYN SURGERY      Cevix frozen 2x,2c-sections    LEEP      RELEASE CARPAL TUNNEL  01/01/2011    bilateral    SOFT TISSUE SURGERY         Current Outpatient Medications   Medication Sig Dispense Refill    ALPRAZolam (XANAX) 0.25 MG tablet As needed for flying. (Purchased in [place].)      cloNIDine (CATAPRES) 0.1 MG tablet Take 1 tablet (0.1 mg) by mouth 2 times daily. 180 tablet 1    estradiol (ESTRACE) 0.1 MG/GM vaginal cream Apply a pea sized amount of cream using fingertip at bedtime to the urethra and vaginal opening. 42.5 g 2    lisinopril-hydrochlorothiazide (ZESTORETIC) 20-25 MG tablet Take 1 tablet by mouth daily. 90 tablet 1    Magnesium Oxide 250 MG TABS Take 1 tablet by mouth daily.      Melatonin 12 MG TABS Take 1 tablet by mouth every 24 hours      meloxicam (MOBIC) 15 MG tablet Take 1 tablet (15 mg) by mouth daily as needed for moderate pain. 30 tablet 3    ocrelizumab (OCREVUS) 300 MG/10ML SOLN injection every 6 months      pregabalin (LYRICA) 150 MG capsule Take 150 mg by mouth 2 times daily.      rizatriptan (MAXALT) 10 MG tablet Take 10 mg by mouth at onset of headache.      sertraline (ZOLOFT) 100 MG tablet Take 1 tablet (100 mg) by mouth daily at 2 pm. 90 tablet 1    simvastatin (ZOCOR) 20 MG tablet Take 1 tablet (20 mg) by mouth at bedtime. 90 tablet 3    traZODone (DESYREL) 50 MG tablet Take 1 tablet (50 mg) by mouth at bedtime. 90 tablet 3    vitamin B-12 (CYANOCOBALAMIN) 100 MCG tablet Take 500 mcg by mouth every other day (Patient not taking: Reported on 4/29/2025)         ALLERGIES: Patient has no known allergies.     GENERAL PHYSICAL EXAM:   Vitals: BP (!) 128/90   Pulse 56   Temp 97.1  F (36.2  C) (Temporal)   Resp 18   SpO2 98%   There is no height or weight on file to calculate BMI.    GENERAL: Well groomed, well developed, well nourished female in [facility].  ENT:  ENT exam normal  CV:  Warm Extremities   RESPIRATORY:  Normal  respiratory effort   GI:  Soft, ND, NT  MS: Moving all four  NEURO: Alert and oriented x 3.  PSYCH: Normal mood and affect, pleasant and agreeable during interview and exam.    : Nonpalpable bladder.      PVR: Residual urine by ultrasound was 10 ml.           RADIOLOGY: The following tests were reviewed:   Narrative & Impression   Exam: US RENAL COMPLETE NON-VASCULAR     Exam reason: MS.  Urinary retention.  Eval for hydro.  Please check  post-void residual.  Thanks!; MS (multiple sclerosis) (H); History of  urinary retention; Sacral neurostimulator in situ     Technique:  Ultrasound of the kidneys and bladder was performed.     Comparison: None.     FINDINGS:     Right Kidney:   The right kidney measures 9.2 cm in length. Normal in size and  echogenicity.  No hydronephrosis, definite calculi, or solid mass.        Left Kidney:   The left kidney measures 9.5 cm in length. Normal in size and  echogenicity.  No hydronephrosis, definite calculi, or solid mass.  There is an anechoic cyst in the left kidney measuring up to 1.5 cm.     Bladder:  No wall thickening or mass. Post void residual measures 88  mL.                                                                      IMPRESSION:     No hydronephrosis.     DANE DANIELSON MD        LABS: The last test results for Ms. Farzana William were reviewed.   Recent Results (from the past 24 hours)   Urinalysis Macroscopic   Result Value Ref Range    Color Urine Yellow Colorless, Straw, Light Yellow, Yellow    Appearance Urine Clear Clear    Glucose Urine Negative Negative mg/dL    Bilirubin Urine Negative Negative    Ketones Urine Negative Negative mg/dL    Specific Gravity Urine 1.010 1.000 - 1.030    Blood Urine Negative Negative    pH Urine 6.5 5.0 - 9.0    Protein Albumin Urine Negative Negative mg/dL    Urobilinogen Urine Normal Normal mg/dL    Nitrite Urine Negative Negative    Leukocyte Esterase Urine Negative Negative       BMP -   Recent Labs   Lab Test  04/29/25  1153 08/14/24  1042 04/30/24  1214 08/11/22  0906    136  --  139   POTASSIUM 4.3 3.9  --  4.1   CHLORIDE 107 101  --  106   CO2 24 25  --  25   BUN 28.8* 17.2  --  27   CR 1.02* 1.02*  --  0.98   GLC 97 99  --  102*   LENA 10.9* 11.0* 10.7* 10.2*   MAG 2.0  --   --  1.8   PHOS 2.2*  --   --   --        CBC -   Recent Labs   Lab Test 04/29/25  1153   WBC 6.9   HGB 13.4          ASSESSMENT:   History of urge incontinence status post sacral neurostimulator.  This is complicated by medical history pertinent with an MS.  She has a history of urinary retention, however retention is stable today.  PLAN:   1. History of urinary retention   Negative for urinary retention today.  Regular bowel movements are likely promoting more complete emptying.  Encouraged patient to continue with MiraLAX regimen, should she have increasing stool leakage she could add a second dose of Metamucil to allow for bulking and/or reduce MiraLAX to half cap daily.      2. Urge incontinence  Patient is pleased with her improvement in urinary symptoms with changing of her InterStim device.  We will continue with current settings.  I would recommend she limit bladder irritants, and avoid constipation.  She has planned follow-up with her urology team in [place] in November and April.  She can follow-up with our team as needed if her symptoms worsen while she is in [place].      37 minutes spent on the date of this encounter doing chart review, history and exam, documentation and further activities as noted above.        [facility]

## 2025-07-07 NOTE — NURSING NOTE
"Chief Complaint   Patient presents with    Follow Up     1 mo, urinary incontinence         Initial BP (!) 128/90   Pulse 56   Temp 97.1  F (36.2  C) (Temporal)   Resp 18   SpO2 98%  Estimated body mass index is 35.95 kg/m  as calculated from the following:    Height as of 4/29/25: 1.486 m (4' 10.5\").    Weight as of 5/30/25: 79.4 kg (175 lb).    PVR-10    Georgia Hutson LPN      "

## 2025-07-16 ENCOUNTER — TRANSFERRED RECORDS (OUTPATIENT)
Dept: HEALTH INFORMATION MANAGEMENT | Facility: CLINIC | Age: 65
End: 2025-07-16
Payer: COMMERCIAL

## 2025-07-28 ENCOUNTER — THERAPY VISIT (OUTPATIENT)
Dept: PHYSICAL THERAPY | Facility: HOSPITAL | Age: 65
End: 2025-07-28
Attending: PSYCHIATRY & NEUROLOGY
Payer: COMMERCIAL

## 2025-07-28 DIAGNOSIS — G35 MS (MULTIPLE SCLEROSIS) (H): ICD-10-CM

## 2025-07-28 DIAGNOSIS — R26.9 GAIT DISTURBANCE: Primary | ICD-10-CM

## 2025-07-28 PROCEDURE — 97112 NEUROMUSCULAR REEDUCATION: CPT | Mod: GP

## 2025-07-28 PROCEDURE — 97164 PT RE-EVAL EST PLAN CARE: CPT | Mod: GP

## 2025-08-01 NOTE — PROGRESS NOTES
07/28/25 0500   Appointment Info   Signing clinician's name / credentials Pauly Levy DPT (Tom)   Total/Authorized Visits 365   Visits Used 5   Medical Diagnosis Gait disturbance (R26.9), MS (multiple sclerosis) (H) (G35)   PT Tx Diagnosis Fall risk   [facility] Auth & Certification   Progress Note/Certification   Start of Care Date 05/13/25   Onset of illness/injury or Date of Surgery 04/21/25   Therapy Frequency 2x week tapered to discharge   Predicted Duration 12 weeks   Certification date from 07/28/25   Certification date to 10/20/25   Progress Note Completed Date 07/28/25   [facility] Authorization Information   Condition type Chronic (continuous duration >3 months)   Cause of current episode Unspecified   Nature of treatment Rehabilitative   Functional ability Moderate functional limitations   Documented POC (choose all that apply) Measurable short and long term/discharge treatment goals related to physical and functional deficits.   Briefly describe symptoms Frequent falls and gait disturbance related to LE functional weakness and  dizziness   How did the symptoms start In 2006 had right foot drop leading to MS diagnosis, most recent fall in April of this year down in [place] ocean.   Last 24H: avg pain/symptom intensity  6/10   Past wk: avg pain/symptom intensity 6/10   Frequency of Symptoms Frequently (51-75% of the time)   Symptom impact on ADLs Quite a bit   Condition change since eval A little worse   General health reported by patient Good   GOALS   PT Goals 2;3;4   PT Goal 1   Goal Identifier STG 1   Goal Description Patient will be independent with a short-term home exercise program.   Goal Progress Poor compliance intersession; Resetting goal today   Target Date 08/25/25   PT Goal 2   Goal Identifier STG 2   Goal Description Patient will understand and demonstrate improved posture and techniques such that patient places less strain over spine and supporting musculature and aids in  "maintaining center of mass within base of support.   Target Date 08/25/25   PT Goal 3   Goal Identifier LTG 1   Goal Description Improve score on utilized outcome measures to correlate with clinically significant change.   Target Date 10/20/25   PT Goal 4   Goal Identifier LTG 2   Goal Description Patient will endorse confidence in ability to manage home exercise program independently to promote continued progression and management of back pain symptoms following discharge from PT services.   Target Date 10/20/25   Subjective Report   Subjective Report Pt returns after 6 weeks away from PT in hopes of re-establishing a PT schedule to work on her balance and confidence in movement; Pt notes several falls since last PT visit with one as recent as this past weekend when she was in her camper while on boat lift and she was inside; Pt states that she fell when it moved, denies LoC or pursuing emergency care, pt endorses having some minor bruises on arm and hip.   Objective Measures   Objective Measures Objective Measure 1;Objective Measure 2   Objective Measure 1   Objective Measure Single Leg balance- <3 seconds BL   Objective Measure 2   Objective Measure 3MBWT   Details 32 seconds   Treatment Interventions (PT)   Interventions Neuromuscular Re-education   Neuromuscular Re-education   Neuromuscular re-ed of mvmt, balance, coord, kinesthetic sense, posture, proprioception minutes (80687) 25   Neuro Re-ed 1 - Details Safety awareness training in utilizing HEP and progression to avoid aggravation/further injury with \"reps in reserve\" training model.  Review of education provided regarding balance systems of visual, proprioceptive, and vestibular and how deficits to these systems can result in balance concerns and symptoms.   Education and demonstration of the relationship between center of mass positioning and base of support with a focus on encouraging increased awareness of this dynamic during gait and transfers in " regards to decreasing risk of falls; Activity with patient standing in front of mirror and moving CoM to edge of Ingrid in narrow and normal stance as well as with SPC to demonstrate increased balance with broader Ingrid   Skilled Intervention Balance control exercise and symptom correlation with movement activity   Patient Response/Progress Fair- Concern for retention of learning provided today   Eval/Assessments   Assessments PT Re-Eval   PT Eval, Re-eval Minutes (94014) 15   Plan   Home program Revisit prescribed HEP and use journaling to improve compliance and retention   Plan for next session Dynamic and dual task balance training (Blazepods); Consider treadmill walking with weight mitigation (LiteSpeed Lift)         [facility]                                                                                   OUTPATIENT PHYSICAL THERAPY    PLAN OF TREATMENT FOR OUTPATIENT REHABILITATION   Patient's Last Name, First Name, KIMColeSASHACole  Farzana William  JESSI YOB: 1960   Provider's Name   [facility]   Medical Record No.  [id number]     Onset Date: 04/21/25  Start of Care Date: 05/13/25     Medical Diagnosis:  Gait disturbance (R26.9), MS (multiple sclerosis) (H) (G35)      PT Treatment Diagnosis:  Fall risk Plan of Treatment  Frequency/Duration: 2x week tapered to discharge/ 12 weeks    Certification date from 07/28/25 to 10/20/25         See note for plan of treatment details and functional goals     Pauly Landaverde) Mildred, DPT, NRP, ITPT  TMJ and Orofacial Pain Specialist  Certified in Dry Needling and Blood Flow Restriction                          I CERTIFY THE NEED FOR THESE SERVICES FURNISHED UNDER        THIS PLAN OF TREATMENT AND WHILE UNDER MY CARE     (Physician attestation of this document indicates review and certification of the therapy plan).              Referring Provider:  Manjinder Del Angel    Initial Assessment  See Epic Evaluation- Start  of Care Date: 05/13/25

## 2025-08-04 ENCOUNTER — ANCILLARY PROCEDURE (OUTPATIENT)
Dept: MAMMOGRAPHY | Facility: OTHER | Age: 65
End: 2025-08-04
Attending: FAMILY MEDICINE
Payer: COMMERCIAL

## 2025-08-04 DIAGNOSIS — Z12.31 ENCOUNTER FOR SCREENING MAMMOGRAM FOR BREAST CANCER: ICD-10-CM

## 2025-08-04 PROCEDURE — 77063 BREAST TOMOSYNTHESIS BI: CPT | Mod: 26 | Performed by: RADIOLOGY

## 2025-08-04 PROCEDURE — 77067 SCR MAMMO BI INCL CAD: CPT | Mod: TC

## 2025-08-04 PROCEDURE — 77067 SCR MAMMO BI INCL CAD: CPT | Mod: 26 | Performed by: RADIOLOGY

## 2025-08-08 SDOH — HEALTH STABILITY: PHYSICAL HEALTH: ON AVERAGE, HOW MANY MINUTES DO YOU ENGAGE IN EXERCISE AT THIS LEVEL?: 30 MIN

## 2025-08-08 SDOH — HEALTH STABILITY: PHYSICAL HEALTH: ON AVERAGE, HOW MANY DAYS PER WEEK DO YOU ENGAGE IN MODERATE TO STRENUOUS EXERCISE (LIKE A BRISK WALK)?: 4 DAYS

## 2025-08-08 ASSESSMENT — SOCIAL DETERMINANTS OF HEALTH (SDOH): HOW OFTEN DO YOU GET TOGETHER WITH FRIENDS OR RELATIVES?: TWICE A WEEK

## 2025-08-13 ENCOUNTER — OFFICE VISIT (OUTPATIENT)
Dept: FAMILY MEDICINE | Facility: OTHER | Age: 65
End: 2025-08-13
Attending: FAMILY MEDICINE
Payer: COMMERCIAL

## 2025-08-13 ENCOUNTER — ANCILLARY PROCEDURE (OUTPATIENT)
Dept: GENERAL RADIOLOGY | Facility: OTHER | Age: 65
End: 2025-08-13
Attending: FAMILY MEDICINE
Payer: COMMERCIAL

## 2025-08-13 VITALS
TEMPERATURE: 97.7 F | WEIGHT: 178.8 LBS | HEIGHT: 59 IN | DIASTOLIC BLOOD PRESSURE: 80 MMHG | OXYGEN SATURATION: 96 % | HEART RATE: 62 BPM | RESPIRATION RATE: 17 BRPM | BODY MASS INDEX: 36.04 KG/M2 | SYSTOLIC BLOOD PRESSURE: 118 MMHG

## 2025-08-13 DIAGNOSIS — Z00.00 MEDICARE ANNUAL WELLNESS VISIT, SUBSEQUENT: Primary | ICD-10-CM
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
## 2025-08-13 PROCEDURE — 73502 X-RAY EXAM HIP UNI 2-3 VIEWS: CPT | Mod: 26 | Performed by: RADIOLOGY

## 2025-08-13 PROCEDURE — 73502 X-RAY EXAM HIP UNI 2-3 VIEWS: CPT | Mod: TC

## 2025-08-13 PROCEDURE — G0463 HOSPITAL OUTPT CLINIC VISIT: HCPCS

## 2025-08-13 PROCEDURE — 72220 X-RAY EXAM SACRUM TAILBONE: CPT | Mod: 26 | Performed by: RADIOLOGY

## 2025-08-13 PROCEDURE — 72220 X-RAY EXAM SACRUM TAILBONE: CPT | Mod: TC

## 2025-08-13 RX ORDER — SIMVASTATIN 20 MG
20 TABLET ORAL AT BEDTIME
Qty: 90 TABLET | Refills: 3 | Status: CANCELLED | OUTPATIENT
Start: 2025-08-13

## 2025-08-13 RX ORDER — OCRELIZUMAB 300 MG/10ML
INJECTION INTRAVENOUS
Status: CANCELLED | OUTPATIENT
Start: 2025-08-13

## 2025-08-13 RX ORDER — MELOXICAM 15 MG/1
15 TABLET ORAL DAILY PRN
Qty: 30 TABLET | Refills: 3 | Status: CANCELLED | OUTPATIENT
Start: 2025-08-13

## 2025-08-13 RX ORDER — CLONIDINE HYDROCHLORIDE 0.1 MG/1
0.1 TABLET ORAL 2 TIMES DAILY
Qty: 180 TABLET | Refills: 1 | Status: CANCELLED | OUTPATIENT
Start: 2025-08-13

## 2025-08-13 RX ORDER — SERTRALINE HYDROCHLORIDE 100 MG/1
100 TABLET, FILM COATED ORAL
Qty: 90 TABLET | Refills: 1 | Status: CANCELLED | OUTPATIENT
Start: 2025-08-13

## 2025-08-13 RX ORDER — CHLORHEXIDINE GLUCONATE 4 %
1 LIQUID (ML) TOPICAL EVERY 24 HOURS
Status: CANCELLED | OUTPATIENT
Start: 2025-08-13

## 2025-08-13 RX ORDER — RIZATRIPTAN BENZOATE 10 MG/1
10 TABLET ORAL
Status: CANCELLED | OUTPATIENT
Start: 2025-08-13

## 2025-08-13 RX ORDER — PREGABALIN 150 MG/1
150 CAPSULE ORAL 2 TIMES DAILY
Status: CANCELLED | OUTPATIENT
Start: 2025-08-13

## 2025-08-13 RX ORDER — TRAZODONE HYDROCHLORIDE 50 MG/1
50 TABLET ORAL AT BEDTIME
Qty: 90 TABLET | Refills: 3 | Status: CANCELLED | OUTPATIENT
Start: 2025-08-13

## 2025-08-13 RX ORDER — ESTRADIOL 0.1 MG/G
CREAM VAGINAL
Qty: 42.5 G | Refills: 2 | Status: CANCELLED | OUTPATIENT
Start: 2025-08-13

## 2025-08-13 RX ORDER — LISINOPRIL AND HYDROCHLOROTHIAZIDE 20; 25 MG/1; MG/1
1 TABLET ORAL DAILY
Qty: 90 TABLET | Refills: 1 | Status: CANCELLED | OUTPATIENT
Start: 2025-08-13

## 2025-08-13 RX ORDER — ALPRAZOLAM 0.25 MG
TABLET ORAL
Status: CANCELLED | OUTPATIENT
Start: 2025-08-13

## 2025-08-13 ASSESSMENT — ANXIETY QUESTIONNAIRES
GAD7 TOTAL SCORE: 4
7. FEELING AFRAID AS IF SOMETHING AWFUL MIGHT HAPPEN: NOT AT ALL
3. WORRYING TOO MUCH ABOUT DIFFERENT THINGS: SEVERAL DAYS
2. NOT BEING ABLE TO STOP OR CONTROL WORRYING: NOT AT ALL
6. BECOMING EASILY ANNOYED OR IRRITABLE: SEVERAL DAYS
8. IF YOU CHECKED OFF ANY PROBLEMS, HOW DIFFICULT HAVE THESE MADE IT FOR YOU TO DO YOUR WORK, TAKE CARE OF THINGS AT HOME, OR GET ALONG WITH OTHER PEOPLE?: SOMEWHAT DIFFICULT
5. BEING SO RESTLESS THAT IT IS HARD TO SIT STILL: SEVERAL DAYS
1. FEELING NERVOUS, ANXIOUS, OR ON EDGE: NOT AT ALL
7. FEELING AFRAID AS IF SOMETHING AWFUL MIGHT HAPPEN: NOT AT ALL
GAD7 TOTAL SCORE: 4
GAD7 TOTAL SCORE: 4
IF YOU CHECKED OFF ANY PROBLEMS ON THIS QUESTIONNAIRE, HOW DIFFICULT HAVE THESE PROBLEMS MADE IT FOR YOU TO DO YOUR WORK, TAKE CARE OF THINGS AT HOME, OR GET ALONG WITH OTHER PEOPLE: SOMEWHAT DIFFICULT
4. TROUBLE RELAXING: SEVERAL DAYS

## 2025-08-13 ASSESSMENT — PAIN SCALES - GENERAL: PAINLEVEL_OUTOF10: SEVERE PAIN (7)

## 2025-08-20 ENCOUNTER — THERAPY VISIT (OUTPATIENT)
Dept: PHYSICAL THERAPY | Facility: HOSPITAL | Age: 65
End: 2025-08-20
Attending: PSYCHIATRY & NEUROLOGY
Payer: COMMERCIAL

## 2025-08-20 DIAGNOSIS — G35 MS (MULTIPLE SCLEROSIS) (H): ICD-10-CM

## 2025-08-20 DIAGNOSIS — R26.9 GAIT DISTURBANCE: Primary | ICD-10-CM

## 2025-08-20 PROCEDURE — 97110 THERAPEUTIC EXERCISES: CPT | Mod: GP,CQ

## 2025-08-20 PROCEDURE — 97112 NEUROMUSCULAR REEDUCATION: CPT | Mod: GP,CQ

## 2025-08-26 ENCOUNTER — THERAPY VISIT (OUTPATIENT)
Dept: PHYSICAL THERAPY | Facility: HOSPITAL | Age: 65
End: 2025-08-26
Attending: PSYCHIATRY & NEUROLOGY
Payer: COMMERCIAL

## 2025-08-26 DIAGNOSIS — R26.9 GAIT DISTURBANCE: Primary | ICD-10-CM

## 2025-08-26 DIAGNOSIS — G35 MS (MULTIPLE SCLEROSIS) (H): ICD-10-CM

## 2025-08-26 PROCEDURE — 97530 THERAPEUTIC ACTIVITIES: CPT | Mod: GP,CQ

## 2025-08-26 PROCEDURE — 97110 THERAPEUTIC EXERCISES: CPT | Mod: GP,CQ

## 2025-08-28 ENCOUNTER — THERAPY VISIT (OUTPATIENT)
Dept: PHYSICAL THERAPY | Facility: HOSPITAL | Age: 65
End: 2025-08-28
Attending: PSYCHIATRY & NEUROLOGY
Payer: COMMERCIAL

## 2025-08-28 DIAGNOSIS — G35 MS (MULTIPLE SCLEROSIS) (H): ICD-10-CM

## 2025-08-28 DIAGNOSIS — R26.9 GAIT DISTURBANCE: Primary | ICD-10-CM

## 2025-08-28 PROCEDURE — 97112 NEUROMUSCULAR REEDUCATION: CPT | Mod: GP,CQ

## 2025-08-28 PROCEDURE — 97110 THERAPEUTIC EXERCISES: CPT | Mod: GP,CQ

## 2025-09-03 ENCOUNTER — THERAPY VISIT (OUTPATIENT)
Dept: PHYSICAL THERAPY | Facility: HOSPITAL | Age: 65
End: 2025-09-03
Attending: PSYCHIATRY & NEUROLOGY
Payer: COMMERCIAL
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
## 2025-09-03 PROCEDURE — 97010 HOT OR COLD PACKS THERAPY: CPT | Mod: GP,CQ

## 2025-09-03 PROCEDURE — 97110 THERAPEUTIC EXERCISES: CPT | Mod: GP,CQ

## 2025-09-03 PROCEDURE — 97140 MANUAL THERAPY 1/> REGIONS: CPT | Mod: GP,CQ
